# Patient Record
Sex: MALE | Race: WHITE | Employment: FULL TIME | URBAN - METROPOLITAN AREA
[De-identification: names, ages, dates, MRNs, and addresses within clinical notes are randomized per-mention and may not be internally consistent; named-entity substitution may affect disease eponyms.]

---

## 2017-08-25 ENCOUNTER — OFFICE VISIT (OUTPATIENT)
Dept: FAMILY MEDICINE CLINIC | Facility: CLINIC | Age: 52
End: 2017-08-25

## 2017-08-25 VITALS
HEART RATE: 84 BPM | BODY MASS INDEX: 38 KG/M2 | DIASTOLIC BLOOD PRESSURE: 82 MMHG | WEIGHT: 257 LBS | SYSTOLIC BLOOD PRESSURE: 140 MMHG

## 2017-08-25 DIAGNOSIS — Z00.00 ROUTINE GENERAL MEDICAL EXAMINATION AT A HEALTH CARE FACILITY: ICD-10-CM

## 2017-08-25 DIAGNOSIS — I10 ESSENTIAL HYPERTENSION: Primary | ICD-10-CM

## 2017-08-25 PROCEDURE — 99212 OFFICE O/P EST SF 10 MIN: CPT | Performed by: FAMILY MEDICINE

## 2017-08-25 PROCEDURE — 99203 OFFICE O/P NEW LOW 30 MIN: CPT | Performed by: FAMILY MEDICINE

## 2017-08-25 RX ORDER — LISINOPRIL 20 MG/1
20 TABLET ORAL DAILY
Qty: 30 TABLET | Refills: 0 | Status: SHIPPED | OUTPATIENT
Start: 2017-08-25 | End: 2017-09-15

## 2017-08-31 ENCOUNTER — LAB ENCOUNTER (OUTPATIENT)
Dept: LAB | Age: 52
End: 2017-08-31
Attending: FAMILY MEDICINE
Payer: COMMERCIAL

## 2017-08-31 DIAGNOSIS — Z00.00 ROUTINE GENERAL MEDICAL EXAMINATION AT A HEALTH CARE FACILITY: ICD-10-CM

## 2017-08-31 LAB
ALBUMIN SERPL BCP-MCNC: 4.5 G/DL (ref 3.5–4.8)
ALBUMIN/GLOB SERPL: 1.7 {RATIO} (ref 1–2)
ALP SERPL-CCNC: 52 U/L (ref 32–100)
ALT SERPL-CCNC: 30 U/L (ref 17–63)
ANION GAP SERPL CALC-SCNC: 7 MMOL/L (ref 0–18)
AST SERPL-CCNC: 22 U/L (ref 15–41)
BASOPHILS # BLD: 0.1 K/UL (ref 0–0.2)
BASOPHILS NFR BLD: 1 %
BILIRUB SERPL-MCNC: 0.5 MG/DL (ref 0.3–1.2)
BILIRUB UR QL: NEGATIVE
BUN SERPL-MCNC: 14 MG/DL (ref 8–20)
BUN/CREAT SERPL: 14.1 (ref 10–20)
CALCIUM SERPL-MCNC: 9.4 MG/DL (ref 8.5–10.5)
CHLORIDE SERPL-SCNC: 105 MMOL/L (ref 95–110)
CHOLEST SERPL-MCNC: 193 MG/DL (ref 110–200)
CO2 SERPL-SCNC: 29 MMOL/L (ref 22–32)
COLOR UR: YELLOW
CREAT SERPL-MCNC: 0.99 MG/DL (ref 0.5–1.5)
EOSINOPHIL # BLD: 0.1 K/UL (ref 0–0.7)
EOSINOPHIL NFR BLD: 1 %
ERYTHROCYTE [DISTWIDTH] IN BLOOD BY AUTOMATED COUNT: 13.9 % (ref 11–15)
GLOBULIN PLAS-MCNC: 2.6 G/DL (ref 2.5–3.7)
GLUCOSE SERPL-MCNC: 107 MG/DL (ref 70–99)
GLUCOSE UR-MCNC: NEGATIVE MG/DL
HCT VFR BLD AUTO: 40.9 % (ref 41–52)
HDLC SERPL-MCNC: 52 MG/DL
HGB BLD-MCNC: 13.8 G/DL (ref 13.5–17.5)
HGB UR QL STRIP.AUTO: NEGATIVE
KETONES UR-MCNC: NEGATIVE MG/DL
LDLC SERPL CALC-MCNC: 114 MG/DL (ref 0–99)
LEUKOCYTE ESTERASE UR QL STRIP.AUTO: NEGATIVE
LYMPHOCYTES # BLD: 2.5 K/UL (ref 1–4)
LYMPHOCYTES NFR BLD: 32 %
MCH RBC QN AUTO: 31.3 PG (ref 27–32)
MCHC RBC AUTO-ENTMCNC: 33.7 G/DL (ref 32–37)
MCV RBC AUTO: 92.8 FL (ref 80–100)
MONOCYTES # BLD: 0.6 K/UL (ref 0–1)
MONOCYTES NFR BLD: 8 %
NEUTROPHILS # BLD AUTO: 4.5 K/UL (ref 1.8–7.7)
NEUTROPHILS NFR BLD: 58 %
NITRITE UR QL STRIP.AUTO: NEGATIVE
NONHDLC SERPL-MCNC: 141 MG/DL
OSMOLALITY UR CALC.SUM OF ELEC: 293 MOSM/KG (ref 275–295)
PH UR: 5 [PH] (ref 5–8)
PLATELET # BLD AUTO: 206 K/UL (ref 140–400)
PMV BLD AUTO: 8.5 FL (ref 7.4–10.3)
POTASSIUM SERPL-SCNC: 4.1 MMOL/L (ref 3.3–5.1)
PROT SERPL-MCNC: 7.1 G/DL (ref 5.9–8.4)
PROT UR-MCNC: NEGATIVE MG/DL
PSA SERPL-MCNC: 2 NG/ML (ref 0–4)
RBC # BLD AUTO: 4.41 M/UL (ref 4.5–5.9)
SODIUM SERPL-SCNC: 141 MMOL/L (ref 136–144)
SP GR UR STRIP: 1.02 (ref 1–1.03)
TRIGL SERPL-MCNC: 135 MG/DL (ref 1–149)
UROBILINOGEN UR STRIP-ACNC: <2
VIT C UR-MCNC: NEGATIVE MG/DL
WBC # BLD AUTO: 7.8 K/UL (ref 4–11)

## 2017-08-31 PROCEDURE — 80061 LIPID PANEL: CPT

## 2017-08-31 PROCEDURE — 80053 COMPREHEN METABOLIC PANEL: CPT

## 2017-08-31 PROCEDURE — 85025 COMPLETE CBC W/AUTO DIFF WBC: CPT

## 2017-08-31 PROCEDURE — 36415 COLL VENOUS BLD VENIPUNCTURE: CPT

## 2017-08-31 PROCEDURE — 81003 URINALYSIS AUTO W/O SCOPE: CPT

## 2017-09-09 ENCOUNTER — TELEPHONE (OUTPATIENT)
Dept: OTHER | Age: 52
End: 2017-09-09

## 2017-09-09 NOTE — TELEPHONE ENCOUNTER
----- Message from Joseline Gonzalez DO sent at 9/8/2017  5:06 PM CDT -----  Please call patient and inform that the laboratory results are acceptable range.

## 2017-09-15 ENCOUNTER — OFFICE VISIT (OUTPATIENT)
Dept: FAMILY MEDICINE CLINIC | Facility: CLINIC | Age: 52
End: 2017-09-15

## 2017-09-15 VITALS
DIASTOLIC BLOOD PRESSURE: 80 MMHG | TEMPERATURE: 98 F | HEIGHT: 69 IN | HEART RATE: 79 BPM | SYSTOLIC BLOOD PRESSURE: 130 MMHG | BODY MASS INDEX: 39.4 KG/M2 | WEIGHT: 266 LBS

## 2017-09-15 DIAGNOSIS — Z00.00 ROUTINE GENERAL MEDICAL EXAMINATION AT A HEALTH CARE FACILITY: Primary | ICD-10-CM

## 2017-09-15 DIAGNOSIS — G47.33 OSA (OBSTRUCTIVE SLEEP APNEA): ICD-10-CM

## 2017-09-15 DIAGNOSIS — I10 ESSENTIAL HYPERTENSION: ICD-10-CM

## 2017-09-15 PROCEDURE — 99396 PREV VISIT EST AGE 40-64: CPT | Performed by: FAMILY MEDICINE

## 2017-09-15 RX ORDER — LISINOPRIL 20 MG/1
20 TABLET ORAL DAILY
Qty: 90 TABLET | Refills: 3 | Status: SHIPPED | OUTPATIENT
Start: 2017-09-15 | End: 2018-08-01

## 2017-10-06 ENCOUNTER — TELEPHONE (OUTPATIENT)
Dept: GASTROENTEROLOGY | Facility: CLINIC | Age: 52
End: 2017-10-06

## 2017-10-06 ENCOUNTER — OFFICE VISIT (OUTPATIENT)
Dept: GASTROENTEROLOGY | Facility: CLINIC | Age: 52
End: 2017-10-06

## 2017-10-06 VITALS
WEIGHT: 263 LBS | BODY MASS INDEX: 39.4 KG/M2 | HEIGHT: 68.5 IN | HEART RATE: 69 BPM | SYSTOLIC BLOOD PRESSURE: 134 MMHG | DIASTOLIC BLOOD PRESSURE: 84 MMHG

## 2017-10-06 DIAGNOSIS — Z12.11 ENCOUNTER FOR SCREENING COLONOSCOPY: Primary | ICD-10-CM

## 2017-10-06 DIAGNOSIS — Z12.11 COLON CANCER SCREENING: Primary | ICD-10-CM

## 2017-10-06 PROCEDURE — 99243 OFF/OP CNSLTJ NEW/EST LOW 30: CPT | Performed by: PHYSICIAN ASSISTANT

## 2017-10-06 PROCEDURE — 99212 OFFICE O/P EST SF 10 MIN: CPT | Performed by: PHYSICIAN ASSISTANT

## 2017-10-06 NOTE — H&P
[address] [facility] [address] Gastroenterology                                                                                                  Clinic History and Physical     Pa Management:  multiple surgeries to left knee and right elbow (9645)      Past Surgical History:  1978: APPENDECTOMY  No date: [address]: ELBOW SURGERY      Comment: Right elbow  1978: KNEE SURGERY      Comment: Left knee  No date: oropharynx clear, mucus membranes appear moist  CV: regular rate and rhythm  Lung: moves air well; no labored breathing  Abdomen: +obese, soft, non-tender exam in all quadrants without rigidity or guarding, no abnormal bowel sounds noted, no masses are pal prescribed  -Congratulated patient on 2 years cigarette-free    Colonoscopy consent: I have discussed the risks, benefits, and alternatives to colonoscopy with the patient [who demonstrated understanding], including but not limited to the risks of bleeding

## 2017-10-06 NOTE — PATIENT INSTRUCTIONS
1. Schedule colonoscopy with Dr. Rob Mason with MAC sedation. 2.  bowel prep from pharmacy - I have prescribed Suprep. This is a smaller volume preparation.  If this is too expensive, however, please call my office and we will order an alternative

## 2017-10-06 NOTE — TELEPHONE ENCOUNTER
Scheduled for:  Colonoscopy 24815  Provider Name: Dr Janel Alvarado  Date:  Fri 12/08/17  Location:  [facility]  Sedation:  MAC  Time:  8:00 am  Prep: split dose suprep  Meds/Allergies Reconciled?:  Oxycodone  Diagnosis with codes:  Colon cancer screening Z12.11  Was patient

## 2017-10-19 NOTE — TELEPHONE ENCOUNTER
LMTCB- D/t change in Dr/ MG schedule, pt needs to be rescheduled for his procedure (new date of 12/29/17 on hold).

## 2017-10-20 NOTE — TELEPHONE ENCOUNTER
Pt contacted and he states that unfortunately at this time, he would prefer to wait until after the new year to arrange his CLN. He states he is planning on having shoulder surgery and will be off work for 4 weeks.  He states he wants to Reply.io all that tim

## 2017-12-01 ENCOUNTER — OFFICE VISIT (OUTPATIENT)
Dept: FAMILY MEDICINE CLINIC | Facility: CLINIC | Age: 52
End: 2017-12-01

## 2017-12-01 VITALS
DIASTOLIC BLOOD PRESSURE: 77 MMHG | HEART RATE: 76 BPM | SYSTOLIC BLOOD PRESSURE: 114 MMHG | WEIGHT: 260 LBS | BODY MASS INDEX: 39 KG/M2

## 2017-12-01 DIAGNOSIS — I10 ESSENTIAL HYPERTENSION: Primary | ICD-10-CM

## 2017-12-01 DIAGNOSIS — N52.2 DRUG-INDUCED ERECTILE DYSFUNCTION: ICD-10-CM

## 2017-12-01 PROCEDURE — 99214 OFFICE O/P EST MOD 30 MIN: CPT | Performed by: FAMILY MEDICINE

## 2017-12-01 PROCEDURE — 99212 OFFICE O/P EST SF 10 MIN: CPT | Performed by: FAMILY MEDICINE

## 2017-12-01 RX ORDER — TADALAFIL 20 MG/1
20 TABLET ORAL AS NEEDED
Qty: 3 TABLET | Refills: 5 | Status: SHIPPED | OUTPATIENT
Start: 2017-12-01 | End: 2018-01-08

## 2017-12-01 NOTE — PROGRESS NOTES
HPI:    Patient ID: Henrry Lagunas is a 46year old male.     HPI  Patient presents with:  Hypertension: f/u medication, will need form from [facility] signed  Shoulder Pain: c/o right shoulder, would like to discuss surgery Dr. Gaston Colin recommended    Review

## 2018-01-08 RX ORDER — IBUPROFEN 800 MG/1
800 TABLET ORAL EVERY 6 HOURS PRN
COMMUNITY
End: 2018-08-03

## 2018-01-11 NOTE — H&P
ORTHO SURGERY H&P  Jose Johnson is a 46year old male. MRN is [id number].  Admitted: (Not on file)    CC: Right shoulder pain    HPI: Mr. Maria Teresa Scott is a 46year old, right hand dominant , who presents complaining of right shoulder pain. He injured negative. Denies chest pain, shortness of breath, palpitations, fever, chills, sweats, weight change, fatigue, malaise, abdominal pain, nausea, vomiting, and diarrhea.      Vital Signs:  Ht 5' 8\" (1.727 m)   Wt 260 lb (117.9 kg)   BMI 39.53 kg/m²     Physi soft tissue calcifications or fractures. Assessment/Plan:    Mr. Bijal Mattson was seen and evaluated by Dr. Deangelo Hernandez. He has what appears to be an acute on chronic rotator cuff tear of the right shoulder. He has some atrophy on his MRI.     Given his young age

## 2018-01-15 ENCOUNTER — HOSPITAL ENCOUNTER (OUTPATIENT)
Facility: HOSPITAL | Age: 53
Setting detail: HOSPITAL OUTPATIENT SURGERY
Discharge: HOME | End: 2018-01-15
Attending: ORTHOPAEDIC SURGERY | Admitting: ORTHOPAEDIC SURGERY
Payer: COMMERCIAL

## 2018-01-15 ENCOUNTER — HOSPITAL ENCOUNTER (OUTPATIENT)
Facility: HOSPITAL | Age: 53
Setting detail: OBSERVATION
Discharge: HOME | End: 2018-01-16
Attending: EMERGENCY MEDICINE | Admitting: HOSPITALIST
Payer: COMMERCIAL

## 2018-01-15 ENCOUNTER — APPOINTMENT (OUTPATIENT)
Dept: GENERAL RADIOLOGY | Facility: HOSPITAL | Age: 53
End: 2018-01-15
Attending: EMERGENCY MEDICINE
Payer: COMMERCIAL

## 2018-01-15 ENCOUNTER — SURGERY (OUTPATIENT)
Age: 53
End: 2018-01-15

## 2018-01-15 ENCOUNTER — ANESTHESIA EVENT (OUTPATIENT)
Dept: SURGERY | Facility: HOSPITAL | Age: 53
End: 2018-01-15
Payer: COMMERCIAL

## 2018-01-15 ENCOUNTER — ANESTHESIA (OUTPATIENT)
Dept: SURGERY | Facility: HOSPITAL | Age: 53
End: 2018-01-15
Payer: COMMERCIAL

## 2018-01-15 VITALS
BODY MASS INDEX: 40.16 KG/M2 | DIASTOLIC BLOOD PRESSURE: 62 MMHG | WEIGHT: 265 LBS | TEMPERATURE: 98 F | OXYGEN SATURATION: 95 % | SYSTOLIC BLOOD PRESSURE: 128 MMHG | HEIGHT: 68 IN | HEART RATE: 79 BPM | RESPIRATION RATE: 18 BRPM

## 2018-01-15 DIAGNOSIS — M75.101 UNSPECIFIED ROTATOR CUFF TEAR OR RUPTURE OF RIGHT SHOULDER, NOT SPECIFIED AS TRAUMATIC: Primary | ICD-10-CM

## 2018-01-15 DIAGNOSIS — G56.80 PHRENIC NERVE PALSY: Primary | ICD-10-CM

## 2018-01-15 LAB
ANION GAP SERPL CALC-SCNC: 9 MMOL/L (ref 0–18)
APTT PPP: 24.4 SECONDS (ref 23.2–35.3)
BASOPHILS # BLD: 0 K/UL (ref 0–0.2)
BASOPHILS NFR BLD: 0 %
BUN SERPL-MCNC: 11 MG/DL (ref 8–20)
BUN/CREAT SERPL: 9.1 (ref 10–20)
CALCIUM SERPL-MCNC: 9.2 MG/DL (ref 8.5–10.5)
CHLORIDE SERPL-SCNC: 104 MMOL/L (ref 95–110)
CO2 SERPL-SCNC: 24 MMOL/L (ref 22–32)
CREAT SERPL-MCNC: 1.21 MG/DL (ref 0.5–1.5)
EOSINOPHIL # BLD: 0 K/UL (ref 0–0.7)
EOSINOPHIL NFR BLD: 0 %
ERYTHROCYTE [DISTWIDTH] IN BLOOD BY AUTOMATED COUNT: 14.6 % (ref 11–15)
GLUCOSE SERPL-MCNC: 153 MG/DL (ref 70–99)
HCT VFR BLD AUTO: 40.3 % (ref 41–52)
HGB BLD-MCNC: 13.4 G/DL (ref 13.5–17.5)
INR BLD: 1 (ref 0.9–1.2)
LYMPHOCYTES # BLD: 0.7 K/UL (ref 1–4)
LYMPHOCYTES NFR BLD: 7 %
MCH RBC QN AUTO: 31.2 PG (ref 27–32)
MCHC RBC AUTO-ENTMCNC: 33.3 G/DL (ref 32–37)
MCV RBC AUTO: 93.7 FL (ref 80–100)
MONOCYTES # BLD: 0.2 K/UL (ref 0–1)
MONOCYTES NFR BLD: 2 %
NEUTROPHILS # BLD AUTO: 8.4 K/UL (ref 1.8–7.7)
NEUTROPHILS NFR BLD: 90 %
OSMOLALITY UR CALC.SUM OF ELEC: 286 MOSM/KG (ref 275–295)
PLATELET # BLD AUTO: 274 K/UL (ref 140–400)
PMV BLD AUTO: 7.2 FL (ref 7.4–10.3)
POTASSIUM SERPL-SCNC: 4.6 MMOL/L (ref 3.3–5.1)
PROTHROMBIN TIME: 12.6 SECONDS (ref 11.8–14.5)
RBC # BLD AUTO: 4.3 M/UL (ref 4.5–5.9)
SODIUM SERPL-SCNC: 137 MMOL/L (ref 136–144)
TROPONIN I SERPL-MCNC: 0 NG/ML (ref ?–0.03)
WBC # BLD AUTO: 9.3 K/UL (ref 4–11)

## 2018-01-15 PROCEDURE — 64415 NJX AA&/STRD BRCH PLXS IMG: CPT | Performed by: ORTHOPAEDIC SURGERY

## 2018-01-15 PROCEDURE — 71045 X-RAY EXAM CHEST 1 VIEW: CPT | Performed by: EMERGENCY MEDICINE

## 2018-01-15 PROCEDURE — 99220 INITIAL OBSERVATION CARE,LEVL III: CPT | Performed by: HOSPITALIST

## 2018-01-15 PROCEDURE — 99152 MOD SED SAME PHYS/QHP 5/>YRS: CPT | Performed by: ORTHOPAEDIC SURGERY

## 2018-01-15 PROCEDURE — 3E0T3BZ INTRODUCTION OF ANESTHETIC AGENT INTO PERIPHERAL NERVES AND PLEXI, PERCUTANEOUS APPROACH: ICD-10-PCS | Performed by: ANESTHESIOLOGY

## 2018-01-15 PROCEDURE — 0LQ14ZZ REPAIR RIGHT SHOULDER TENDON, PERCUTANEOUS ENDOSCOPIC APPROACH: ICD-10-PCS | Performed by: ORTHOPAEDIC SURGERY

## 2018-01-15 PROCEDURE — 76942 ECHO GUIDE FOR BIOPSY: CPT | Performed by: ORTHOPAEDIC SURGERY

## 2018-01-15 PROCEDURE — 0RNJ4ZZ RELEASE RIGHT SHOULDER JOINT, PERCUTANEOUS ENDOSCOPIC APPROACH: ICD-10-PCS | Performed by: ORTHOPAEDIC SURGERY

## 2018-01-15 DEVICE — ANCHOR SUT 5.5MM BCMPS: Type: IMPLANTABLE DEVICE | Site: SHOULDER | Status: FUNCTIONAL

## 2018-01-15 DEVICE — SYSTEM ESCP FX 19.1MM 4.75MM: Type: IMPLANTABLE DEVICE | Site: SHOULDER | Status: FUNCTIONAL

## 2018-01-15 RX ORDER — SODIUM CHLORIDE, SODIUM LACTATE, POTASSIUM CHLORIDE, CALCIUM CHLORIDE 600; 310; 30; 20 MG/100ML; MG/100ML; MG/100ML; MG/100ML
INJECTION, SOLUTION INTRAVENOUS CONTINUOUS
Status: DISCONTINUED | OUTPATIENT
Start: 2018-01-15 | End: 2018-01-15

## 2018-01-15 RX ORDER — ROPIVACAINE HYDROCHLORIDE 5 MG/ML
INJECTION, SOLUTION EPIDURAL; INFILTRATION; PERINEURAL AS NEEDED
Status: DISCONTINUED | OUTPATIENT
Start: 2018-01-15 | End: 2018-01-15 | Stop reason: SURG

## 2018-01-15 RX ORDER — ACETAMINOPHEN 325 MG/1
650 TABLET ORAL EVERY 4 HOURS PRN
Status: DISCONTINUED | OUTPATIENT
Start: 2018-01-15 | End: 2018-01-16

## 2018-01-15 RX ORDER — HYDROCODONE BITARTRATE AND ACETAMINOPHEN 7.5; 325 MG/1; MG/1
1 TABLET ORAL EVERY 4 HOURS PRN
Status: DISCONTINUED | OUTPATIENT
Start: 2018-01-15 | End: 2018-01-15

## 2018-01-15 RX ORDER — MORPHINE SULFATE 15 MG/1
15 TABLET ORAL EVERY 4 HOURS PRN
Status: CANCELLED | OUTPATIENT
Start: 2018-01-15 | End: 2018-01-16

## 2018-01-15 RX ORDER — MIDAZOLAM HYDROCHLORIDE 1 MG/ML
INJECTION INTRAMUSCULAR; INTRAVENOUS AS NEEDED
Status: DISCONTINUED | OUTPATIENT
Start: 2018-01-15 | End: 2018-01-15 | Stop reason: SURG

## 2018-01-15 RX ORDER — ACETAMINOPHEN 325 MG/1
650 TABLET ORAL EVERY 6 HOURS PRN
Status: DISCONTINUED | OUTPATIENT
Start: 2018-01-15 | End: 2018-01-16

## 2018-01-15 RX ORDER — DEXAMETHASONE SODIUM PHOSPHATE 10 MG/ML
INJECTION, SOLUTION INTRAMUSCULAR; INTRAVENOUS AS NEEDED
Status: DISCONTINUED | OUTPATIENT
Start: 2018-01-15 | End: 2018-01-15 | Stop reason: SURG

## 2018-01-15 RX ORDER — LIDOCAINE HYDROCHLORIDE 10 MG/ML
INJECTION, SOLUTION EPIDURAL; INFILTRATION; INTRACAUDAL; PERINEURAL AS NEEDED
Status: DISCONTINUED | OUTPATIENT
Start: 2018-01-15 | End: 2018-01-15 | Stop reason: SURG

## 2018-01-15 RX ORDER — HYDROCODONE BITARTRATE AND ACETAMINOPHEN 7.5; 325 MG/1; MG/1
1 TABLET ORAL EVERY 4 HOURS PRN
Qty: 60 TABLET | Refills: 0 | Status: SHIPPED | OUTPATIENT
Start: 2018-01-15 | End: 2018-08-03

## 2018-01-15 RX ORDER — DEXAMETHASONE SODIUM PHOSPHATE 4 MG/ML
VIAL (ML) INJECTION AS NEEDED
Status: DISCONTINUED | OUTPATIENT
Start: 2018-01-15 | End: 2018-01-15 | Stop reason: SURG

## 2018-01-15 RX ORDER — ROCURONIUM BROMIDE 10 MG/ML
INJECTION, SOLUTION INTRAVENOUS AS NEEDED
Status: DISCONTINUED | OUTPATIENT
Start: 2018-01-15 | End: 2018-01-15 | Stop reason: SURG

## 2018-01-15 RX ORDER — ENOXAPARIN SODIUM 100 MG/ML
30 INJECTION SUBCUTANEOUS EVERY 12 HOURS
Status: DISCONTINUED | OUTPATIENT
Start: 2018-01-15 | End: 2018-01-16

## 2018-01-15 RX ORDER — MORPHINE SULFATE 4 MG/ML
6 INJECTION, SOLUTION INTRAMUSCULAR; INTRAVENOUS EVERY 2 HOUR PRN
Status: CANCELLED | OUTPATIENT
Start: 2018-01-15 | End: 2018-01-16

## 2018-01-15 RX ORDER — MORPHINE SULFATE 2 MG/ML
1 INJECTION, SOLUTION INTRAMUSCULAR; INTRAVENOUS EVERY 2 HOUR PRN
Status: DISCONTINUED | OUTPATIENT
Start: 2018-01-15 | End: 2018-01-16

## 2018-01-15 RX ORDER — ACETAMINOPHEN 500 MG
1000 TABLET ORAL ONCE
Status: COMPLETED | OUTPATIENT
Start: 2018-01-15 | End: 2018-01-15

## 2018-01-15 RX ORDER — MORPHINE SULFATE 10 MG/ML
6 INJECTION, SOLUTION INTRAMUSCULAR; INTRAVENOUS EVERY 10 MIN PRN
Status: DISCONTINUED | OUTPATIENT
Start: 2018-01-15 | End: 2018-01-15

## 2018-01-15 RX ORDER — GLYCOPYRROLATE 0.2 MG/ML
INJECTION INTRAMUSCULAR; INTRAVENOUS AS NEEDED
Status: DISCONTINUED | OUTPATIENT
Start: 2018-01-15 | End: 2018-01-15 | Stop reason: SURG

## 2018-01-15 RX ORDER — MORPHINE SULFATE 2 MG/ML
2 INJECTION, SOLUTION INTRAMUSCULAR; INTRAVENOUS EVERY 2 HOUR PRN
Status: DISCONTINUED | OUTPATIENT
Start: 2018-01-15 | End: 2018-01-16

## 2018-01-15 RX ORDER — FAMOTIDINE 20 MG/1
20 TABLET ORAL ONCE
Status: COMPLETED | OUTPATIENT
Start: 2018-01-15 | End: 2018-01-15

## 2018-01-15 RX ORDER — HYDROCODONE BITARTRATE AND ACETAMINOPHEN 5; 325 MG/1; MG/1
2 TABLET ORAL EVERY 4 HOURS PRN
Status: DISCONTINUED | OUTPATIENT
Start: 2018-01-15 | End: 2018-01-16

## 2018-01-15 RX ORDER — SODIUM CHLORIDE 0.9 % (FLUSH) 0.9 %
3 SYRINGE (ML) INJECTION AS NEEDED
Status: DISCONTINUED | OUTPATIENT
Start: 2018-01-15 | End: 2018-01-16

## 2018-01-15 RX ORDER — NALOXONE HYDROCHLORIDE 0.4 MG/ML
80 INJECTION, SOLUTION INTRAMUSCULAR; INTRAVENOUS; SUBCUTANEOUS AS NEEDED
Status: DISCONTINUED | OUTPATIENT
Start: 2018-01-15 | End: 2018-01-15

## 2018-01-15 RX ORDER — MORPHINE SULFATE 4 MG/ML
4 INJECTION, SOLUTION INTRAMUSCULAR; INTRAVENOUS EVERY 2 HOUR PRN
Status: DISCONTINUED | OUTPATIENT
Start: 2018-01-15 | End: 2018-01-16

## 2018-01-15 RX ORDER — HYDROMORPHONE HYDROCHLORIDE 1 MG/ML
0.2 INJECTION, SOLUTION INTRAMUSCULAR; INTRAVENOUS; SUBCUTANEOUS EVERY 5 MIN PRN
Status: DISCONTINUED | OUTPATIENT
Start: 2018-01-15 | End: 2018-01-15

## 2018-01-15 RX ORDER — HYDROCODONE BITARTRATE AND ACETAMINOPHEN 5; 325 MG/1; MG/1
1 TABLET ORAL AS NEEDED
Status: DISCONTINUED | OUTPATIENT
Start: 2018-01-15 | End: 2018-01-15

## 2018-01-15 RX ORDER — ACETAMINOPHEN 500 MG
1000 TABLET ORAL EVERY 8 HOURS
Status: CANCELLED | OUTPATIENT
Start: 2018-01-15 | End: 2018-01-16

## 2018-01-15 RX ORDER — MORPHINE SULFATE 4 MG/ML
4 INJECTION, SOLUTION INTRAMUSCULAR; INTRAVENOUS EVERY 10 MIN PRN
Status: DISCONTINUED | OUTPATIENT
Start: 2018-01-15 | End: 2018-01-15

## 2018-01-15 RX ORDER — HYDROCODONE BITARTRATE AND ACETAMINOPHEN 5; 325 MG/1; MG/1
2 TABLET ORAL AS NEEDED
Status: DISCONTINUED | OUTPATIENT
Start: 2018-01-15 | End: 2018-01-15

## 2018-01-15 RX ORDER — GABAPENTIN 100 MG/1
100 CAPSULE ORAL 2 TIMES DAILY
Qty: 6 CAPSULE | Refills: 0 | Status: SHIPPED | OUTPATIENT
Start: 2018-01-15 | End: 2018-01-26 | Stop reason: ALTCHOICE

## 2018-01-15 RX ORDER — HYDROMORPHONE HYDROCHLORIDE 1 MG/ML
0.4 INJECTION, SOLUTION INTRAMUSCULAR; INTRAVENOUS; SUBCUTANEOUS EVERY 5 MIN PRN
Status: DISCONTINUED | OUTPATIENT
Start: 2018-01-15 | End: 2018-01-15

## 2018-01-15 RX ORDER — OXYCODONE HYDROCHLORIDE 5 MG/1
5 TABLET ORAL EVERY 4 HOURS PRN
Status: CANCELLED | OUTPATIENT
Start: 2018-01-15 | End: 2018-01-16

## 2018-01-15 RX ORDER — METOCLOPRAMIDE 10 MG/1
10 TABLET ORAL ONCE
Status: COMPLETED | OUTPATIENT
Start: 2018-01-15 | End: 2018-01-15

## 2018-01-15 RX ORDER — MORPHINE SULFATE 2 MG/ML
2 INJECTION, SOLUTION INTRAMUSCULAR; INTRAVENOUS EVERY 10 MIN PRN
Status: DISCONTINUED | OUTPATIENT
Start: 2018-01-15 | End: 2018-01-15

## 2018-01-15 RX ORDER — MORPHINE SULFATE 4 MG/ML
4 INJECTION, SOLUTION INTRAMUSCULAR; INTRAVENOUS EVERY 2 HOUR PRN
Status: CANCELLED | OUTPATIENT
Start: 2018-01-15 | End: 2018-01-16

## 2018-01-15 RX ORDER — GABAPENTIN 100 MG/1
100 CAPSULE ORAL ONCE
Status: COMPLETED | OUTPATIENT
Start: 2018-01-15 | End: 2018-01-15

## 2018-01-15 RX ORDER — ONDANSETRON 2 MG/ML
INJECTION INTRAMUSCULAR; INTRAVENOUS AS NEEDED
Status: DISCONTINUED | OUTPATIENT
Start: 2018-01-15 | End: 2018-01-15 | Stop reason: SURG

## 2018-01-15 RX ORDER — HYDROCODONE BITARTRATE AND ACETAMINOPHEN 5; 325 MG/1; MG/1
1 TABLET ORAL EVERY 4 HOURS PRN
Status: DISCONTINUED | OUTPATIENT
Start: 2018-01-15 | End: 2018-01-16

## 2018-01-15 RX ORDER — HALOPERIDOL 5 MG/ML
0.25 INJECTION INTRAMUSCULAR ONCE AS NEEDED
Status: DISCONTINUED | OUTPATIENT
Start: 2018-01-15 | End: 2018-01-15

## 2018-01-15 RX ORDER — ONDANSETRON 2 MG/ML
4 INJECTION INTRAMUSCULAR; INTRAVENOUS ONCE AS NEEDED
Status: DISCONTINUED | OUTPATIENT
Start: 2018-01-15 | End: 2018-01-15

## 2018-01-15 RX ORDER — OXYCODONE HYDROCHLORIDE 5 MG/1
10 TABLET ORAL EVERY 4 HOURS PRN
Status: CANCELLED | OUTPATIENT
Start: 2018-01-15 | End: 2018-01-16

## 2018-01-15 RX ORDER — MORPHINE SULFATE 2 MG/ML
2 INJECTION, SOLUTION INTRAMUSCULAR; INTRAVENOUS EVERY 2 HOUR PRN
Status: CANCELLED | OUTPATIENT
Start: 2018-01-15 | End: 2018-01-16

## 2018-01-15 RX ORDER — GABAPENTIN 100 MG/1
100 CAPSULE ORAL 2 TIMES DAILY
Status: DISCONTINUED | OUTPATIENT
Start: 2018-01-15 | End: 2018-01-15

## 2018-01-15 RX ORDER — ONDANSETRON 2 MG/ML
4 INJECTION INTRAMUSCULAR; INTRAVENOUS EVERY 6 HOURS PRN
Status: DISCONTINUED | OUTPATIENT
Start: 2018-01-15 | End: 2018-01-16

## 2018-01-15 RX ORDER — CEFAZOLIN SODIUM/WATER 2 G/20 ML
2 SYRINGE (ML) INTRAVENOUS ONCE
Status: COMPLETED | OUTPATIENT
Start: 2018-01-15 | End: 2018-01-15

## 2018-01-15 RX ORDER — HYDROMORPHONE HYDROCHLORIDE 1 MG/ML
0.6 INJECTION, SOLUTION INTRAMUSCULAR; INTRAVENOUS; SUBCUTANEOUS EVERY 5 MIN PRN
Status: DISCONTINUED | OUTPATIENT
Start: 2018-01-15 | End: 2018-01-15

## 2018-01-15 RX ADMIN — ROPIVACAINE HYDROCHLORIDE 30 ML: 5 INJECTION, SOLUTION EPIDURAL; INFILTRATION; PERINEURAL at 10:53:00

## 2018-01-15 RX ADMIN — DEXAMETHASONE SODIUM PHOSPHATE 5 MG: 10 INJECTION, SOLUTION INTRAMUSCULAR; INTRAVENOUS at 10:52:00

## 2018-01-15 RX ADMIN — LIDOCAINE HYDROCHLORIDE 10 MG: 10 INJECTION, SOLUTION EPIDURAL; INFILTRATION; INTRACAUDAL; PERINEURAL at 10:38:00

## 2018-01-15 RX ADMIN — DEXAMETHASONE SODIUM PHOSPHATE 10 MG: 10 INJECTION, SOLUTION INTRAMUSCULAR; INTRAVENOUS at 07:50:00

## 2018-01-15 RX ADMIN — MIDAZOLAM HYDROCHLORIDE 2 MG: 1 INJECTION INTRAMUSCULAR; INTRAVENOUS at 07:21:00

## 2018-01-15 RX ADMIN — SODIUM CHLORIDE, SODIUM LACTATE, POTASSIUM CHLORIDE, CALCIUM CHLORIDE: 600; 310; 30; 20 INJECTION, SOLUTION INTRAVENOUS at 07:57:00

## 2018-01-15 RX ADMIN — DEXAMETHASONE SODIUM PHOSPHATE 4 MG: 4 MG/ML VIAL (ML) INJECTION at 08:01:00

## 2018-01-15 RX ADMIN — CEFAZOLIN SODIUM/WATER 2 G: 2 G/20 ML SYRINGE (ML) INTRAVENOUS at 08:10:00

## 2018-01-15 RX ADMIN — ROPIVACAINE HYDROCHLORIDE 30 ML: 5 INJECTION, SOLUTION EPIDURAL; INFILTRATION; PERINEURAL at 07:50:00

## 2018-01-15 RX ADMIN — LIDOCAINE HYDROCHLORIDE 5 ML: 10 INJECTION, SOLUTION EPIDURAL; INFILTRATION; INTRACAUDAL; PERINEURAL at 07:24:00

## 2018-01-15 RX ADMIN — GLYCOPYRROLATE 0.1 MG: 0.2 INJECTION INTRAMUSCULAR; INTRAVENOUS at 08:01:00

## 2018-01-15 RX ADMIN — SODIUM CHLORIDE, SODIUM LACTATE, POTASSIUM CHLORIDE, CALCIUM CHLORIDE: 600; 310; 30; 20 INJECTION, SOLUTION INTRAVENOUS at 09:55:00

## 2018-01-15 RX ADMIN — LIDOCAINE HYDROCHLORIDE 50 MG: 10 INJECTION, SOLUTION EPIDURAL; INFILTRATION; INTRACAUDAL; PERINEURAL at 08:01:00

## 2018-01-15 RX ADMIN — ROCURONIUM BROMIDE 5 MG: 10 INJECTION, SOLUTION INTRAVENOUS at 08:01:00

## 2018-01-15 RX ADMIN — ONDANSETRON 4 MG: 2 INJECTION INTRAMUSCULAR; INTRAVENOUS at 09:55:00

## 2018-01-15 NOTE — ANESTHESIA PREPROCEDURE EVALUATION
Anesthesia PreOp Note    HPI:     Tawny Waterman is a 46year old male who presents for preoperative consultation requested by: Sonia Gee MD    Date of Surgery: 1/15/2018    Procedure(s):  SHOULDER ARTHROSCOPY ROTATOR CUFF REPAIR  Indication: Right Sexual activity: Not on file     Other Topics Concern    Caffeine Concern Yes    Comment: Tea, 2 cups daily. Social History Narrative   None on file       Available pre-op labs reviewed. Vital Signs: Body mass index is 40.29 kg/m².    heig I have informed this Prerna Oliveira [relative] of the risks of regional anesthesia including, but not limited to: failure, toxicity, seizure, cardiac arrest, infection, bleeding, nerve damage. The patient desires the proposed regional anesthetic as planned.

## 2018-01-15 NOTE — OPERATIVE REPORT
Operative Note    Patient Name: Dionicio Brian    Preoperative Diagnosis: Right shoulder rotator cuff tear     Postoperative Diagnosis: Right shoulder rotator cuff tear, subacromial impingement    Primary Surgeon: Galo Rivera MD     Assistant: Avelina Quinones

## 2018-01-15 NOTE — OPERATIVE REPORT
[facility]    PATIENT'S NAME: KATHERYN Nixon   ATTENDING PHYSICIAN: Dominik Edgar MD   OPERATING PHYSICIAN: Dominik Edgar MD   PATIENT ACCOUNT#:   [id number]    LOCATION:  [facility] PACU 1 [facility] 10  MEDICAL RECORD #:   [id number]       LEIGHA degenerative fraying and fibrillation, but no significant instability of the biceps anchor was noted. The labrum itself appeared well anchored to the superior glenoid. Anterior and posterior ninfa also had some degenerative changes but no tear.   3.   Sub motorized shaver and radiofrequency wand were used to debride the superior labrum of all fibrillated material.  The undersurface of the rotator cuff was also debrided of all fibrillated material.  Adhesions between the superior labrum and the supraspinatus remaining full-thickness defects in the rotator cuff noted. The excess suture was removed. Attention was paid to hemostasis. The instruments were removed. The portals were closed with 4-0 nylon sutures.   Sterile dressing was applied followed by an Ultr

## 2018-01-15 NOTE — ANESTHESIA POSTPROCEDURE EVALUATION
Patient: Quentin Guido    Procedure Summary     Date:  01/15/18 Room / Location:  [address] [place] [address] OR 07 / [address] [place] [address] OR    Anesthesia Start:  9624 Anesthesia Stop:  4526    Procedure:  SHOULDER ARTHROSCOPY ROTATOR CUFF REPAIR (Right ) Diagnosis:  (Right shoulder ro

## 2018-01-15 NOTE — ANESTHESIA PROCEDURE NOTES
Peripheral Block    Anesthesiologist:  Aylin Winter  Performed by:   Anesthesiologist  Patient Location:  PACU  Start Time:  1/15/2018 7:09 AM  End Time:  1/15/2018 7:51 AM  Site Identification: ultrasound guided, real time ultrasound guided, static ultrasou

## 2018-01-16 ENCOUNTER — APPOINTMENT (OUTPATIENT)
Dept: GENERAL RADIOLOGY | Facility: HOSPITAL | Age: 53
End: 2018-01-16
Attending: HOSPITALIST
Payer: COMMERCIAL

## 2018-01-16 VITALS
DIASTOLIC BLOOD PRESSURE: 84 MMHG | TEMPERATURE: 99 F | SYSTOLIC BLOOD PRESSURE: 139 MMHG | HEART RATE: 87 BPM | RESPIRATION RATE: 18 BRPM | WEIGHT: 260 LBS | HEIGHT: 68 IN | BODY MASS INDEX: 39.4 KG/M2 | OXYGEN SATURATION: 97 %

## 2018-01-16 LAB
ANION GAP SERPL CALC-SCNC: 9 MMOL/L (ref 0–18)
BASOPHILS # BLD: 0 K/UL (ref 0–0.2)
BASOPHILS NFR BLD: 0 %
BUN SERPL-MCNC: 13 MG/DL (ref 8–20)
BUN/CREAT SERPL: 13.7 (ref 10–20)
CALCIUM SERPL-MCNC: 8.6 MG/DL (ref 8.5–10.5)
CHLORIDE SERPL-SCNC: 107 MMOL/L (ref 95–110)
CO2 SERPL-SCNC: 21 MMOL/L (ref 22–32)
CREAT SERPL-MCNC: 0.95 MG/DL (ref 0.5–1.5)
EOSINOPHIL # BLD: 0 K/UL (ref 0–0.7)
EOSINOPHIL NFR BLD: 0 %
ERYTHROCYTE [DISTWIDTH] IN BLOOD BY AUTOMATED COUNT: 14.7 % (ref 11–15)
GLUCOSE SERPL-MCNC: 136 MG/DL (ref 70–99)
HCT VFR BLD AUTO: 34.9 % (ref 41–52)
HGB BLD-MCNC: 11.4 G/DL (ref 13.5–17.5)
LYMPHOCYTES # BLD: 2 K/UL (ref 1–4)
LYMPHOCYTES NFR BLD: 16 %
MCH RBC QN AUTO: 31 PG (ref 27–32)
MCHC RBC AUTO-ENTMCNC: 32.8 G/DL (ref 32–37)
MCV RBC AUTO: 94.4 FL (ref 80–100)
MONOCYTES # BLD: 1 K/UL (ref 0–1)
MONOCYTES NFR BLD: 8 %
NEUTROPHILS # BLD AUTO: 9.8 K/UL (ref 1.8–7.7)
NEUTROPHILS NFR BLD: 76 %
OSMOLALITY UR CALC.SUM OF ELEC: 286 MOSM/KG (ref 275–295)
PLATELET # BLD AUTO: 244 K/UL (ref 140–400)
PMV BLD AUTO: 7.5 FL (ref 7.4–10.3)
POTASSIUM SERPL-SCNC: 4.2 MMOL/L (ref 3.3–5.1)
RBC # BLD AUTO: 3.69 M/UL (ref 4.5–5.9)
SODIUM SERPL-SCNC: 137 MMOL/L (ref 136–144)
WBC # BLD AUTO: 12.8 K/UL (ref 4–11)

## 2018-01-16 PROCEDURE — 71046 X-RAY EXAM CHEST 2 VIEWS: CPT | Performed by: HOSPITALIST

## 2018-01-16 PROCEDURE — 99217 OBSERVATION CARE DISCHARGE: CPT | Performed by: HOSPITALIST

## 2018-01-16 NOTE — PROGRESS NOTES
Subjective: No complaints. Pain controlled. Dyspnea resolved. No chest pain.     Objective:  Patient Vitals for the past 24 hrs:   BP Temp Temp src Pulse Resp SpO2 Height Weight   01/16/18 0519 141/88 98.6 °F (37 °C) Oral 87 18 97 % - -   01/16/18 0234 -

## 2018-01-16 NOTE — RESPIRATORY THERAPY NOTE
ANATOLY ASSESSMENT:    Pt does have a previous diagnosis of ANATOLY. Pt does routinely use a CPAP device at home. This pt is suspected to be at high risk for ANATOLY and sleep lab packet was provided to patient for outpatient follow-up.     CPAP INITIATION:    Pt to be

## 2018-01-16 NOTE — H&P
[facility]    PATIENT'S NAME: Jillian Colindres   ATTENDING PHYSICIAN: Kylee Fabian MD   PATIENT ACCOUNT#:   [id number]    LOCATION:  [address] [place] [address] RECORD #:   [id number]       YOB: 1965  ADMISSION DATE:       01/15/2018 SIGNS:  Temperature 97.6, pulse 105, respiratory rate 22, blood pressure 156/68, pulse oximetry 96% on room air. HEENT:  Atraumatic. Oropharynx clear. Moist mucous membranes. Normal hard and soft palate. NECK:  Trachea midline. Full range of motion.

## 2018-01-16 NOTE — PROGRESS NOTES
[facility] Note: Antimicrobial Weight Dose Adjustment for:Enoxaparin Sodium (LOVENOX)     Beata Cintron is a 46year old male who has been prescribed Enoxaparin Sodium (LOVENOX) injection 40 mg daily.  CrCl is estimated creatinine clearance is 69.1 mL/min

## 2018-01-16 NOTE — ED PROVIDER NOTES
Patient Seen in: [facility] 4w/sw/se    History   Patient presents with:  Breathing Problem    Stated Complaint: SOB s/p rotator cuff surgery this morning     HPI    46year old male who had R rotator cuff surgery this a.m now presents with shortn 97.6 °F (36.4 °C)  Temp src: Oral  SpO2: 96 %  O2 Device: None (Room air)    Current:/81 (BP Location: Right arm)   Pulse 80   Temp 98.8 °F (37.1 °C) (Oral)   Resp 18   Ht 172.7 cm (5' 8\")   Wt 117.9 kg   SpO2 95%   BMI 39.53 kg/m²         Physical 4.30 (*)     HGB 13.4 (*)     HCT 40.3 (*)     MPV 7.2 (*)     Neutrophil Absolute 8.4 (*)     Lymphocyte Absolute 0.7 (*)     All other components within normal limits   TROPONIN I - Normal   PROTHROMBIN TIME (PT) - Normal   PTT, ACTIVATED - Normal   CBC possible phrenic nerve palsy possible side effect of earlier nerve block. Other possibilities were considered including PE, pna, ptx.  There is no infiltrate or ptx noted on CXR and the surgery occurred this a.m and does not seem consistent with PE but pt w

## 2018-01-16 NOTE — DISCHARGE SUMMARY
More than 30 min spent on dc  Dc summary # [id number]  Hospital Discharge Diagnoses: s/p shoulder surg    Lace+ Score: 25  59-90 High Risk  29-58 Medium Risk  0-28   Low Risk.     TCM Follow-Up Recommendation:  Low risk no tcm fu recommeded

## 2018-01-17 ENCOUNTER — TELEPHONE (OUTPATIENT)
Dept: INTERNAL MEDICINE UNIT | Facility: HOSPITAL | Age: 53
End: 2018-01-17

## 2018-01-17 ENCOUNTER — PATIENT OUTREACH (OUTPATIENT)
Dept: CASE MANAGEMENT | Age: 53
End: 2018-01-17

## 2018-01-17 NOTE — PROGRESS NOTES
Initial Post Discharge Follow Up   Discharge Date: 1/16/18  Contact Date: 1/17/2018    Consent Verification:  Assessment Completed With: Patient  HIPAA Verified?   Yes     Discharge Dx:   Phrenic nerve paralysis     General:   • How have you been since yo prescribed a new medication: No new medication   • May I go over your medications with you to make sure we are not missing anything? yes  • Are there any reasons that keep you from taking your medication as prescribed?  No  Are you having any concerns with c tabs PO Q4hrs PRN by Dr Divya Tran office until pain is controlled then to decrease dose back to 1 tab PO Q 4 hrs PRN. Pt stated today pain was more manageable. Pt did report to have c/o constipation last BM yesterday.  Pt stated Dr Divya Tran office had also

## 2018-01-17 NOTE — DISCHARGE SUMMARY
[facility]    PATIENT'S NAME: KATHERYN Kelley   ATTENDING PHYSICIAN: Leah Luz MD   PATIENT ACCOUNT#:   [id number]    LOCATION:  [address] [place] [address] RECORD #:   [id number]       YOB: 1965  ADMISSION DATE:       01/15/ MEDICATIONS:    1. Norco 7.5/325, 1 tablet q.4 h. as needed for pain. Watch total daily limited Tylenol to 3 g.  2.   Motrin 800 mg q.6 h. as needed. Take with food. 3.   Gabapentin 100 mg twice a day. 4.   Lisinopril 20 mg daily.     DISCHARGE INSTRU

## 2018-01-26 ENCOUNTER — OFFICE VISIT (OUTPATIENT)
Dept: FAMILY MEDICINE CLINIC | Facility: CLINIC | Age: 53
End: 2018-01-26

## 2018-01-26 VITALS
BODY MASS INDEX: 40 KG/M2 | HEART RATE: 87 BPM | WEIGHT: 260 LBS | DIASTOLIC BLOOD PRESSURE: 82 MMHG | SYSTOLIC BLOOD PRESSURE: 124 MMHG

## 2018-01-26 DIAGNOSIS — I10 ESSENTIAL HYPERTENSION: ICD-10-CM

## 2018-01-26 DIAGNOSIS — G56.80 PHRENIC NERVE PARALYSIS: Primary | ICD-10-CM

## 2018-01-26 PROCEDURE — 99495 TRANSJ CARE MGMT MOD F2F 14D: CPT | Performed by: FAMILY MEDICINE
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
## 2018-05-23 ENCOUNTER — TELEPHONE (OUTPATIENT)
Dept: OTHER | Age: 53
End: 2018-05-23

## 2018-05-23 NOTE — TELEPHONE ENCOUNTER
Pt called stated RX Cialis is too costly, he checked with his INS RX Revatio (Sildenasil) 20 mg  #50 is affordable -requesting a change   Please reply to helen: EM RN TRIAGE    Please advise on sig/qty

## 2018-05-24 RX ORDER — SILDENAFIL CITRATE 20 MG/1
TABLET ORAL
Qty: 25 TABLET | Refills: 3 | Status: SHIPPED | OUTPATIENT
Start: 2018-05-24 | End: 2018-08-03

## 2018-05-24 NOTE — TELEPHONE ENCOUNTER
Received call from patient who wanted to check on the status of the message below. Message routed to provider for review.      Please reply to helen: YAA Montelongo

## 2018-08-01 RX ORDER — LISINOPRIL 20 MG/1
TABLET ORAL
Qty: 90 TABLET | Refills: 0 | Status: SHIPPED | OUTPATIENT
Start: 2018-08-01 | End: 2018-08-03

## 2018-08-02 ENCOUNTER — NURSE TRIAGE (OUTPATIENT)
Dept: OTHER | Age: 53
End: 2018-08-02

## 2018-08-02 DIAGNOSIS — K52.9 GASTROENTERITIS: Primary | ICD-10-CM

## 2018-08-02 RX ORDER — SILDENAFIL CITRATE 20 MG/1
TABLET ORAL
Qty: 50 TABLET | Refills: 3 | Status: CANCELLED | OUTPATIENT
Start: 2018-08-02

## 2018-08-02 NOTE — TELEPHONE ENCOUNTER
Hypertensive Medications  Protocol Criteria:  · Appointment scheduled in the past 6 months or in the next 3 months  · BMP or CMP in the past 12 months  · Creatinine result < 2  Recent Outpatient Visits            6 months ago Phrenic nerve paralysis    [facility]

## 2018-08-02 NOTE — TELEPHONE ENCOUNTER
Dr Geanie Cooks, please advise on this script. Patient called, asked for refill with disp of 50, stated that he gets discount [facility] if the quantity is 50, but not the same discount if 30. Pharmacy verified.     Refill Protocol Appointment Criteria  · Appointm

## 2018-08-02 NOTE — TELEPHONE ENCOUNTER
There has been significant stomach illness around. Some require abx . Need to run test - see order and have him  speciman container.

## 2018-08-02 NOTE — TELEPHONE ENCOUNTER
Action Requested: Summary for Provider     []  Critical Lab, Recommendations Needed  [x] Need Additional Advice  []   FYI    []   Need Orders  [] Need Medications Sent to Pharmacy  []  Other     SUMMARY:  Dr Adela Chandler, please advise.  Scheduled appt 8/3/18 at

## 2018-08-03 ENCOUNTER — APPOINTMENT (OUTPATIENT)
Dept: LAB | Age: 53
End: 2018-08-03
Attending: FAMILY MEDICINE
Payer: COMMERCIAL

## 2018-08-03 ENCOUNTER — OFFICE VISIT (OUTPATIENT)
Dept: FAMILY MEDICINE CLINIC | Facility: CLINIC | Age: 53
End: 2018-08-03
Payer: COMMERCIAL

## 2018-08-03 VITALS
SYSTOLIC BLOOD PRESSURE: 115 MMHG | DIASTOLIC BLOOD PRESSURE: 77 MMHG | HEART RATE: 85 BPM | BODY MASS INDEX: 40 KG/M2 | TEMPERATURE: 98 F | WEIGHT: 265 LBS

## 2018-08-03 DIAGNOSIS — K52.9 GASTROENTERITIS: ICD-10-CM

## 2018-08-03 DIAGNOSIS — K52.9 GASTROENTERITIS: Primary | ICD-10-CM

## 2018-08-03 DIAGNOSIS — G47.33 OSA (OBSTRUCTIVE SLEEP APNEA): ICD-10-CM

## 2018-08-03 LAB
ADENOVIRUS F 40/41 PCR: NEGATIVE
ASTROVIRUS PCR: NEGATIVE
C CAYETANENSIS DNA SPEC QL NAA+PROBE: NEGATIVE
C. DIFFICILE TOXIN A/B PCR: NEGATIVE
CAMPY SP DNA.DIARRHEA STL QL NAA+PROBE: NEGATIVE
CRYPTOSP DNA SPEC QL NAA+PROBE: NEGATIVE
EAEC PAA PLAS AGGR+AATA ST NAA+NON-PRB: NEGATIVE
EC STX1+STX2 + H7 FLIC SPEC NAA+PROBE: NEGATIVE
ENTAMOEBA HISTOLYTICA PCR: NEGATIVE
EPEC EAE GENE STL QL NAA+NON-PROBE: NEGATIVE
ETEC LTA+ST1A+ST1B TOX ST NAA+NON-PROBE: NEGATIVE
GIARDIA LAMBLIA PCR: NEGATIVE
NOROVIRUS GI/GII PCR: NEGATIVE
P SHIGELLOIDES DNA STL QL NAA+PROBE: NEGATIVE
ROTAVIRUS A PCR: NEGATIVE
SALMONELLA DNA SPEC QL NAA+PROBE: NEGATIVE
SAPOVIRUS PCR: POSITIVE
SHIGELLA SP+EIEC IPAH ST NAA+NON-PROBE: NEGATIVE
V CHOLERAE DNA SPEC QL NAA+PROBE: NEGATIVE
VIBRIO DNA SPEC NAA+PROBE: NEGATIVE
YERSINIA DNA SPEC NAA+PROBE: NEGATIVE

## 2018-08-03 PROCEDURE — 87507 IADNA-DNA/RNA PROBE TQ 12-25: CPT

## 2018-08-03 PROCEDURE — 99214 OFFICE O/P EST MOD 30 MIN: CPT | Performed by: FAMILY MEDICINE

## 2018-08-03 PROCEDURE — 99212 OFFICE O/P EST SF 10 MIN: CPT | Performed by: FAMILY MEDICINE

## 2018-08-03 RX ORDER — LISINOPRIL 20 MG/1
TABLET ORAL
Qty: 90 TABLET | Refills: 0 | Status: SHIPPED | OUTPATIENT
Start: 2018-08-03 | End: 2018-12-07

## 2018-08-03 RX ORDER — SILDENAFIL CITRATE 20 MG/1
TABLET ORAL
Qty: 50 TABLET | Refills: 4 | Status: SHIPPED | OUTPATIENT
Start: 2018-08-03 | End: 2019-12-13

## 2018-08-15 NOTE — TELEPHONE ENCOUNTER
Patient calling and FU refill request, states that he's been asking for it for 2 weeks now, advised that it was esent last 8/3/18 to [facility] that he is not using [facility] anymore, he is using [facility].   Called [facility] and spoke  With Laura dejesus

## 2018-08-30 ENCOUNTER — TELEPHONE (OUTPATIENT)
Dept: FAMILY MEDICINE CLINIC | Facility: CLINIC | Age: 53
End: 2018-08-30

## 2018-12-05 ENCOUNTER — TELEPHONE (OUTPATIENT)
Dept: FAMILY MEDICINE CLINIC | Facility: CLINIC | Age: 53
End: 2018-12-05

## 2018-12-05 NOTE — TELEPHONE ENCOUNTER
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 Please see all docs and letter in doctors bin.

## 2018-12-07 ENCOUNTER — OFFICE VISIT (OUTPATIENT)
Dept: FAMILY MEDICINE CLINIC | Facility: CLINIC | Age: 53
End: 2018-12-07
Payer: COMMERCIAL

## 2018-12-07 ENCOUNTER — MED REC SCAN ONLY (OUTPATIENT)
Dept: FAMILY MEDICINE CLINIC | Facility: CLINIC | Age: 53
End: 2018-12-07

## 2018-12-07 VITALS
WEIGHT: 272 LBS | HEIGHT: 70 IN | SYSTOLIC BLOOD PRESSURE: 178 MMHG | TEMPERATURE: 98 F | DIASTOLIC BLOOD PRESSURE: 78 MMHG | HEART RATE: 81 BPM | BODY MASS INDEX: 38.94 KG/M2

## 2018-12-07 DIAGNOSIS — I10 ESSENTIAL HYPERTENSION: ICD-10-CM

## 2018-12-07 DIAGNOSIS — Z00.00 ROUTINE GENERAL MEDICAL EXAMINATION AT A HEALTH CARE FACILITY: Primary | ICD-10-CM

## 2018-12-07 DIAGNOSIS — G47.33 OSA (OBSTRUCTIVE SLEEP APNEA): ICD-10-CM

## 2018-12-07 PROCEDURE — 90686 IIV4 VACC NO PRSV 0.5 ML IM: CPT | Performed by: FAMILY MEDICINE

## 2018-12-07 PROCEDURE — 90471 IMMUNIZATION ADMIN: CPT | Performed by: FAMILY MEDICINE

## 2018-12-07 PROCEDURE — 99396 PREV VISIT EST AGE 40-64: CPT | Performed by: FAMILY MEDICINE

## 2018-12-07 RX ORDER — LISINOPRIL 20 MG/1
TABLET ORAL
Qty: 90 TABLET | Refills: 3 | Status: SHIPPED | OUTPATIENT
Start: 2018-12-07 | End: 2019-12-10

## 2018-12-07 NOTE — PROGRESS NOTES
consults of cpap, sleep study reports, various reports, sent to dr terrell reviewed on OV pt had on 12/7/18

## 2018-12-07 NOTE — PROGRESS NOTES
HPI:    Patient ID: Mark Flores is a 48year old male.     HPI  Patient presents with:  Complete Form: FORMS TO BE COMPLETED, FOR SLEEP APNEA, CONCERNS WITH WT AGE FOR CPAP   Use fitted oral appliance and ENT surgery for successful treatment of sleep apne equal, round, and reactive to light. Fundoscopic exam:       The right eye shows no hemorrhage and no papilledema. The left eye shows no hemorrhage and no papilledema. Neck: Trachea normal and normal range of motion. Neck supple.  Normal carotid

## 2018-12-14 ENCOUNTER — LAB ENCOUNTER (OUTPATIENT)
Dept: LAB | Age: 53
End: 2018-12-14
Attending: FAMILY MEDICINE
Payer: COMMERCIAL

## 2018-12-14 DIAGNOSIS — Z00.00 ROUTINE GENERAL MEDICAL EXAMINATION AT A HEALTH CARE FACILITY: ICD-10-CM

## 2018-12-14 PROCEDURE — 80053 COMPREHEN METABOLIC PANEL: CPT

## 2018-12-14 PROCEDURE — 81003 URINALYSIS AUTO W/O SCOPE: CPT

## 2018-12-14 PROCEDURE — 85025 COMPLETE CBC W/AUTO DIFF WBC: CPT

## 2018-12-14 PROCEDURE — 36415 COLL VENOUS BLD VENIPUNCTURE: CPT

## 2018-12-14 PROCEDURE — 80061 LIPID PANEL: CPT

## 2019-01-08 ENCOUNTER — MED REC SCAN ONLY (OUTPATIENT)
Dept: FAMILY MEDICINE CLINIC | Facility: CLINIC | Age: 54
End: 2019-01-08

## 2019-01-09 NOTE — PROGRESS NOTES
[place] dental sleep study detailed written order for sleep study, requested referral with information for type of study that will be done in [facility], [facility] signature from  , will review

## 2019-01-23 NOTE — PROGRESS NOTES
Referral forms were signed and faxed to [phone], fax was successful, forms sent to scan with sleep study report

## 2019-07-06 ENCOUNTER — OFFICE VISIT (OUTPATIENT)
Dept: FAMILY MEDICINE CLINIC | Facility: CLINIC | Age: 54
End: 2019-07-06
Payer: COMMERCIAL

## 2019-07-06 VITALS
SYSTOLIC BLOOD PRESSURE: 143 MMHG | HEART RATE: 79 BPM | DIASTOLIC BLOOD PRESSURE: 94 MMHG | BODY MASS INDEX: 38 KG/M2 | WEIGHT: 265 LBS | TEMPERATURE: 98 F

## 2019-07-06 DIAGNOSIS — J01.00 ACUTE MAXILLARY SINUSITIS, RECURRENCE NOT SPECIFIED: Primary | ICD-10-CM

## 2019-07-06 PROCEDURE — 99213 OFFICE O/P EST LOW 20 MIN: CPT | Performed by: FAMILY MEDICINE

## 2019-07-06 RX ORDER — MELOXICAM 7.5 MG/1
TABLET ORAL
Refills: 2 | COMMUNITY
Start: 2019-01-17

## 2019-07-06 RX ORDER — AMOXICILLIN AND CLAVULANATE POTASSIUM 875; 125 MG/1; MG/1
1 TABLET, FILM COATED ORAL 2 TIMES DAILY
Qty: 14 TABLET | Refills: 0 | Status: SHIPPED | OUTPATIENT
Start: 2019-07-06 | End: 2019-07-13

## 2019-07-06 RX ORDER — FLUTICASONE PROPIONATE 50 MCG
2 SPRAY, SUSPENSION (ML) NASAL DAILY
Qty: 1 BOTTLE | Refills: 1 | Status: SHIPPED | OUTPATIENT
Start: 2019-07-06 | End: 2019-07-08 | Stop reason: RX

## 2019-07-06 NOTE — PROGRESS NOTES
HPI:    Patient ID: Faiza Graves is a 48year old male. HPI  Patient presents with:  Nasal Congestion  Cough: c.o cough for 3 weeks with mucus    Review of Systems   Constitutional: Negative. HENT: Positive for congestion and sore throat. • Fluticasone Propionate 50 MCG/ACT Nasal Suspension 1 Bottle 1     Si sprays by Each Nare route daily.        Imaging & Referrals:  None       SO#[id number]

## 2019-07-08 ENCOUNTER — TELEPHONE (OUTPATIENT)
Dept: FAMILY MEDICINE CLINIC | Facility: CLINIC | Age: 54
End: 2019-07-08

## 2019-07-08 RX ORDER — MOMETASONE 50 UG/1
1 SPRAY, METERED NASAL DAILY
Qty: 1 INHALER | Refills: 5 | Status: SHIPPED | OUTPATIENT
Start: 2019-07-08 | End: 2019-12-26

## 2019-07-08 NOTE — TELEPHONE ENCOUNTER
Receive fax regarding drug change request... Den Angles Den Angles Den Angles FLUTICASONE 50MCG NASAL SP (120) rx  QTY 16  SHAKE LIQUID AND USE 2 SPRAYS IN EACH NOSTRIL DAILY       Message: Drug is not covered by patient plan.  The preferred alternative is MOMETASONESPRMCG, FLUNISO

## 2019-12-10 RX ORDER — SILDENAFIL CITRATE 20 MG/1
TABLET ORAL
Qty: 50 TABLET | Refills: 0 | OUTPATIENT
Start: 2019-12-10

## 2019-12-10 RX ORDER — LISINOPRIL 20 MG/1
TABLET ORAL
Qty: 90 TABLET | Refills: 0 | Status: SHIPPED | OUTPATIENT
Start: 2019-12-10 | End: 2020-01-09

## 2019-12-10 NOTE — TELEPHONE ENCOUNTER
Review pended refill request as it does not fall under a protocol.     Last Rx: 08/03/18  LOV: 07/06/19

## 2019-12-10 NOTE — TELEPHONE ENCOUNTER
CSS, please call pt and help schedule follow-up visit with Dr. Jojo Montoya. Last routine general exam was 12/7/18.   Rx refilled [id number]  Hypertensive Medications  Protocol Criteria:  · Appointment scheduled in the past 6 months or in the next 3 months  · BMP o

## 2019-12-13 RX ORDER — SILDENAFIL CITRATE 20 MG/1
TABLET ORAL
Qty: 50 TABLET | Refills: 1 | Status: SHIPPED | OUTPATIENT
Start: 2019-12-13 | End: 2020-09-11

## 2019-12-13 NOTE — TELEPHONE ENCOUNTER
Patient needs a refill on   Sildenafil Citrate (REVATIO) 20 MG Oral Tab 50 tablet 4 8/3/2018    Sig:   Take 1-2 tablets as needed prior to sexual intercourse       He is leaving out of town on Sunday and he needs it before then. Thank you.

## 2019-12-13 NOTE — TELEPHONE ENCOUNTER
Refill Protocol Appointment Criteria Refill passed per [facility] protocol.     · Appointment scheduled in the past 12 months or in the next 3 months  Recent Outpatient Visits            5 months ago Acute maxillary sinusitis, recurrence not specified

## 2019-12-26 ENCOUNTER — OFFICE VISIT (OUTPATIENT)
Dept: FAMILY MEDICINE CLINIC | Facility: CLINIC | Age: 54
End: 2019-12-26
Payer: COMMERCIAL

## 2019-12-26 ENCOUNTER — LAB ENCOUNTER (OUTPATIENT)
Dept: LAB | Age: 54
End: 2019-12-26
Attending: FAMILY MEDICINE
Payer: COMMERCIAL

## 2019-12-26 VITALS
TEMPERATURE: 98 F | BODY MASS INDEX: 37.8 KG/M2 | SYSTOLIC BLOOD PRESSURE: 132 MMHG | HEIGHT: 70 IN | HEART RATE: 70 BPM | WEIGHT: 264 LBS | DIASTOLIC BLOOD PRESSURE: 76 MMHG

## 2019-12-26 DIAGNOSIS — Z00.00 ROUTINE GENERAL MEDICAL EXAMINATION AT A HEALTH CARE FACILITY: Primary | ICD-10-CM

## 2019-12-26 DIAGNOSIS — Z00.00 ROUTINE GENERAL MEDICAL EXAMINATION AT A HEALTH CARE FACILITY: ICD-10-CM

## 2019-12-26 DIAGNOSIS — J01.00 ACUTE MAXILLARY SINUSITIS, RECURRENCE NOT SPECIFIED: ICD-10-CM

## 2019-12-26 PROCEDURE — 80053 COMPREHEN METABOLIC PANEL: CPT

## 2019-12-26 PROCEDURE — 99213 OFFICE O/P EST LOW 20 MIN: CPT | Performed by: FAMILY MEDICINE

## 2019-12-26 PROCEDURE — 80061 LIPID PANEL: CPT

## 2019-12-26 PROCEDURE — 36415 COLL VENOUS BLD VENIPUNCTURE: CPT

## 2019-12-26 PROCEDURE — 85025 COMPLETE CBC W/AUTO DIFF WBC: CPT

## 2019-12-26 RX ORDER — AMOXICILLIN AND CLAVULANATE POTASSIUM 875; 125 MG/1; MG/1
1 TABLET, FILM COATED ORAL 2 TIMES DAILY
Qty: 14 TABLET | Refills: 0 | Status: SHIPPED | OUTPATIENT
Start: 2019-12-26 | End: 2020-01-02

## 2019-12-26 NOTE — PROGRESS NOTES
HPI:    Patient ID: Faiza Graves is a 47year old male.     HPI  Patient presents with:  Sinusitis: with headache, pressure,  sensativity to light,  congestion, cough,  started last thursday, body aches chils,  no fevers     Review of Systems   Constitutio mouth 2 (two) times daily for 7 days.        Imaging & Referrals:  None       #[id number]

## 2020-01-09 ENCOUNTER — OFFICE VISIT (OUTPATIENT)
Dept: FAMILY MEDICINE CLINIC | Facility: CLINIC | Age: 55
End: 2020-01-09
Payer: COMMERCIAL

## 2020-01-09 ENCOUNTER — TELEPHONE (OUTPATIENT)
Dept: FAMILY MEDICINE CLINIC | Facility: CLINIC | Age: 55
End: 2020-01-09

## 2020-01-09 VITALS
HEIGHT: 70 IN | TEMPERATURE: 98 F | SYSTOLIC BLOOD PRESSURE: 152 MMHG | BODY MASS INDEX: 37.8 KG/M2 | DIASTOLIC BLOOD PRESSURE: 79 MMHG | WEIGHT: 264 LBS | HEART RATE: 82 BPM

## 2020-01-09 DIAGNOSIS — Z00.00 ROUTINE GENERAL MEDICAL EXAMINATION AT A HEALTH CARE FACILITY: ICD-10-CM

## 2020-01-09 DIAGNOSIS — G47.33 OSA (OBSTRUCTIVE SLEEP APNEA): ICD-10-CM

## 2020-01-09 DIAGNOSIS — Z12.11 SCREENING FOR COLON CANCER: ICD-10-CM

## 2020-01-09 DIAGNOSIS — I10 ESSENTIAL HYPERTENSION: Primary | ICD-10-CM

## 2020-01-09 PROBLEM — G47.30 SLEEP APNEA: Status: ACTIVE | Noted: 2020-01-09

## 2020-01-09 PROCEDURE — 99396 PREV VISIT EST AGE 40-64: CPT | Performed by: FAMILY MEDICINE

## 2020-01-09 RX ORDER — LISINOPRIL 20 MG/1
TABLET ORAL
Qty: 90 TABLET | Refills: 3 | Status: SHIPPED | OUTPATIENT
Start: 2020-01-09 | End: 2020-03-13

## 2020-01-09 NOTE — TELEPHONE ENCOUNTER
Patient stated   lisinopril 20 MG Oral Tab 90 tablet 3 1/9/2020    Sig:   TAKE 1 TABLET BY MOUTH ONCE DAILY     Route:   (none)     Note to Pharmacy:   12/10 refilled [id number].  Need follow up and blood work       Was sent to the wrong pharmacy and he does n

## 2020-01-09 NOTE — PROGRESS NOTES
HPI:    Patient ID: Beata Cintron is a 47year old male.     HPI  Patient presents with:  Lab Results: follow up for lab results   Referral: for colonoscopy, would like other options     Past Medical History:   Diagnosis Date   • Essential hypertension    • Fundoscopic exam:       The right eye shows no hemorrhage and no papilledema. The left eye shows no hemorrhage and no papilledema. Neck: Trachea normal and normal range of motion. Neck supple. Normal carotid pulses and no JVD present.    Shree Innocent

## 2020-03-13 ENCOUNTER — TELEPHONE (OUTPATIENT)
Dept: FAMILY MEDICINE CLINIC | Facility: CLINIC | Age: 55
End: 2020-03-13

## 2020-03-13 RX ORDER — LISINOPRIL 20 MG/1
TABLET ORAL
Qty: 90 TABLET | Refills: 1 | Status: SHIPPED | OUTPATIENT
Start: 2020-03-13 | End: 2020-09-09

## 2020-03-13 NOTE — TELEPHONE ENCOUNTER
Refill passed per [facility] protocol.   Hypertensive Medications  Protocol Criteria:  · Appointment scheduled in the past 6 months or in the next 3 months  · BMP or CMP in the past 12 months  · Creatinine result < 2  Recent Outpatient Visits

## 2020-03-13 NOTE — TELEPHONE ENCOUNTER
Patient requesting refill sent to [facility] on chart.  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

## 2020-09-08 ENCOUNTER — TELEPHONE (OUTPATIENT)
Dept: FAMILY MEDICINE CLINIC | Facility: CLINIC | Age: 55
End: 2020-09-08

## 2020-09-09 RX ORDER — LISINOPRIL 20 MG/1
TABLET ORAL
Qty: 90 TABLET | Refills: 0 | Status: SHIPPED | OUTPATIENT
Start: 2020-09-09 | End: 2020-12-11

## 2020-09-11 RX ORDER — SILDENAFIL CITRATE 20 MG/1
TABLET ORAL
Qty: 50 TABLET | Refills: 0 | Status: SHIPPED | OUTPATIENT
Start: 2020-09-11 | End: 2020-12-11

## 2020-09-11 NOTE — TELEPHONE ENCOUNTER
I spoke to patient and let him know lisinopril 20 mg was refilled on 9-9-20. I told him I will call [facility] to see if there is any issues.     Spoke to [employer] and they state its ready for . [employer] will call the patient and let him know

## 2020-11-06 ENCOUNTER — OFFICE VISIT (OUTPATIENT)
Dept: FAMILY MEDICINE CLINIC | Facility: CLINIC | Age: 55
End: 2020-11-06
Payer: COMMERCIAL

## 2020-11-06 VITALS
SYSTOLIC BLOOD PRESSURE: 130 MMHG | WEIGHT: 264 LBS | BODY MASS INDEX: 37.8 KG/M2 | HEIGHT: 70 IN | HEART RATE: 87 BPM | DIASTOLIC BLOOD PRESSURE: 82 MMHG

## 2020-11-06 DIAGNOSIS — G47.33 OSA (OBSTRUCTIVE SLEEP APNEA): ICD-10-CM

## 2020-11-06 DIAGNOSIS — Z00.00 ROUTINE GENERAL MEDICAL EXAMINATION AT A HEALTH CARE FACILITY: ICD-10-CM

## 2020-11-06 DIAGNOSIS — I10 ESSENTIAL HYPERTENSION: Primary | ICD-10-CM

## 2020-11-06 PROCEDURE — 3079F DIAST BP 80-89 MM HG: CPT | Performed by: FAMILY MEDICINE

## 2020-11-06 PROCEDURE — 99213 OFFICE O/P EST LOW 20 MIN: CPT | Performed by: FAMILY MEDICINE

## 2020-11-06 PROCEDURE — 3008F BODY MASS INDEX DOCD: CPT | Performed by: FAMILY MEDICINE

## 2020-11-06 PROCEDURE — 99072 ADDL SUPL MATRL&STAF TM PHE: CPT | Performed by: FAMILY MEDICINE

## 2020-11-06 PROCEDURE — 3075F SYST BP GE 130 - 139MM HG: CPT | Performed by: FAMILY MEDICINE

## 2020-11-06 RX ORDER — CHLORTHALIDONE 50 MG/1
TABLET ORAL
COMMUNITY
Start: 2020-11-04 | End: 2021-03-12

## 2020-11-06 NOTE — PROGRESS NOTES
HPI:    Patient ID: Esther Fisher is a 47year old male. HPI  Patient presents with:  HTN: follow up for HTN     Review of Systems   Constitutional: Negative. Respiratory: Negative. Cardiovascular: Negative.              Current Outpatient [facility]

## 2020-12-11 RX ORDER — SILDENAFIL CITRATE 20 MG/1
TABLET ORAL
Qty: 30 TABLET | Refills: 3 | Status: SHIPPED | OUTPATIENT
Start: 2020-12-11 | End: 2021-03-12

## 2020-12-11 RX ORDER — LISINOPRIL 20 MG/1
TABLET ORAL
Qty: 90 TABLET | Refills: 0 | Status: SHIPPED | OUTPATIENT
Start: 2020-12-11 | End: 2020-12-19

## 2020-12-15 ENCOUNTER — TELEPHONE (OUTPATIENT)
Dept: FAMILY MEDICINE CLINIC | Facility: CLINIC | Age: 55
End: 2020-12-15

## 2020-12-15 NOTE — TELEPHONE ENCOUNTER
Per patient's spouse, pharmacy has not received the script for lisinopril. [address]    lisinopril 20 MG Oral Tab 90 tablet 0 12/11/2020     Sig: TAKE 1 TABLET BY MOUTH ONCE DAILY.  PLEASE SCHEDULE A FOLL

## 2020-12-15 NOTE — TELEPHONE ENCOUNTER
Ramona Mclean to verify message below since Rx sent 12/11/20 shows as being received. Spoke with Carmen Guerrier who verifies Rx was not received; verbal given as signed by Dr Uri Hamilton on 12/11/20.

## 2020-12-19 RX ORDER — LISINOPRIL 20 MG/1
TABLET ORAL
Qty: 90 TABLET | Refills: 0 | Status: SHIPPED
Start: 2020-12-19 | End: 2021-03-12

## 2021-03-05 ENCOUNTER — LAB ENCOUNTER (OUTPATIENT)
Dept: LAB | Age: 56
End: 2021-03-05
Attending: FAMILY MEDICINE
Payer: COMMERCIAL

## 2021-03-05 DIAGNOSIS — Z00.00 ROUTINE GENERAL MEDICAL EXAMINATION AT A HEALTH CARE FACILITY: ICD-10-CM

## 2021-03-05 LAB
ALBUMIN SERPL-MCNC: 4.1 G/DL (ref 3.4–5)
ALBUMIN/GLOB SERPL: 1.5 {RATIO} (ref 1–2)
ALP LIVER SERPL-CCNC: 79 U/L
ALT SERPL-CCNC: 26 U/L
ANION GAP SERPL CALC-SCNC: 8 MMOL/L (ref 0–18)
AST SERPL-CCNC: 12 U/L (ref 15–37)
BASOPHILS # BLD AUTO: 0.04 X10(3) UL (ref 0–0.2)
BASOPHILS NFR BLD AUTO: 0.7 %
BILIRUB SERPL-MCNC: 0.3 MG/DL (ref 0.1–2)
BILIRUB UR QL: NEGATIVE
BUN BLD-MCNC: 19 MG/DL (ref 7–18)
BUN/CREAT SERPL: 16.8 (ref 10–20)
CALCIUM BLD-MCNC: 9.1 MG/DL (ref 8.5–10.1)
CHLORIDE SERPL-SCNC: 106 MMOL/L (ref 98–112)
CHOLEST SMN-MCNC: 172 MG/DL (ref ?–200)
CLARITY UR: CLEAR
CO2 SERPL-SCNC: 25 MMOL/L (ref 21–32)
COLOR UR: YELLOW
COMPLEXED PSA SERPL-MCNC: 2.75 NG/ML (ref ?–4)
CREAT BLD-MCNC: 1.13 MG/DL
DEPRECATED RDW RBC AUTO: 45.4 FL (ref 35.1–46.3)
EOSINOPHIL # BLD AUTO: 0.26 X10(3) UL (ref 0–0.7)
EOSINOPHIL NFR BLD AUTO: 4.5 %
ERYTHROCYTE [DISTWIDTH] IN BLOOD BY AUTOMATED COUNT: 12.9 % (ref 11–15)
GLOBULIN PLAS-MCNC: 2.8 G/DL (ref 2.8–4.4)
GLUCOSE BLD-MCNC: 100 MG/DL (ref 70–99)
GLUCOSE UR-MCNC: NEGATIVE MG/DL
HCT VFR BLD AUTO: 41.4 %
HDLC SERPL-MCNC: 51 MG/DL (ref 40–59)
HGB BLD-MCNC: 13.5 G/DL
HGB UR QL STRIP.AUTO: NEGATIVE
IMM GRANULOCYTES # BLD AUTO: 0.05 X10(3) UL (ref 0–1)
IMM GRANULOCYTES NFR BLD: 0.9 %
KETONES UR-MCNC: NEGATIVE MG/DL
LDLC SERPL CALC-MCNC: 88 MG/DL (ref ?–100)
LEUKOCYTE ESTERASE UR QL STRIP.AUTO: NEGATIVE
LYMPHOCYTES # BLD AUTO: 2.47 X10(3) UL (ref 1–4)
LYMPHOCYTES NFR BLD AUTO: 42.3 %
M PROTEIN MFR SERPL ELPH: 6.9 G/DL (ref 6.4–8.2)
MCH RBC QN AUTO: 31 PG (ref 26–34)
MCHC RBC AUTO-ENTMCNC: 32.6 G/DL (ref 31–37)
MCV RBC AUTO: 95.2 FL
MONOCYTES # BLD AUTO: 0.56 X10(3) UL (ref 0.1–1)
MONOCYTES NFR BLD AUTO: 9.6 %
NEUTROPHILS # BLD AUTO: 2.46 X10 (3) UL (ref 1.5–7.7)
NEUTROPHILS # BLD AUTO: 2.46 X10(3) UL (ref 1.5–7.7)
NEUTROPHILS NFR BLD AUTO: 42 %
NITRITE UR QL STRIP.AUTO: NEGATIVE
NONHDLC SERPL-MCNC: 121 MG/DL (ref ?–130)
OSMOLALITY SERPL CALC.SUM OF ELEC: 290 MOSM/KG (ref 275–295)
PATIENT FASTING Y/N/NP: YES
PATIENT FASTING Y/N/NP: YES
PH UR: 5 [PH] (ref 5–8)
PLATELET # BLD AUTO: 230 10(3)UL (ref 150–450)
POTASSIUM SERPL-SCNC: 4.6 MMOL/L (ref 3.5–5.1)
PROT UR-MCNC: NEGATIVE MG/DL
RBC # BLD AUTO: 4.35 X10(6)UL
SODIUM SERPL-SCNC: 139 MMOL/L (ref 136–145)
SP GR UR STRIP: 1.02 (ref 1–1.03)
TRIGL SERPL-MCNC: 166 MG/DL (ref 30–149)
UROBILINOGEN UR STRIP-ACNC: <2
VLDLC SERPL CALC-MCNC: 33 MG/DL (ref 0–30)
WBC # BLD AUTO: 5.8 X10(3) UL (ref 4–11)

## 2021-03-05 PROCEDURE — 81003 URINALYSIS AUTO W/O SCOPE: CPT

## 2021-03-05 PROCEDURE — 80061 LIPID PANEL: CPT

## 2021-03-05 PROCEDURE — 36415 COLL VENOUS BLD VENIPUNCTURE: CPT

## 2021-03-05 PROCEDURE — 85025 COMPLETE CBC W/AUTO DIFF WBC: CPT

## 2021-03-05 PROCEDURE — 80053 COMPREHEN METABOLIC PANEL: CPT

## 2021-03-12 ENCOUNTER — OFFICE VISIT (OUTPATIENT)
Dept: FAMILY MEDICINE CLINIC | Facility: CLINIC | Age: 56
End: 2021-03-12
Payer: COMMERCIAL

## 2021-03-12 VITALS
HEIGHT: 70 IN | WEIGHT: 263 LBS | HEART RATE: 70 BPM | DIASTOLIC BLOOD PRESSURE: 84 MMHG | SYSTOLIC BLOOD PRESSURE: 147 MMHG | BODY MASS INDEX: 37.65 KG/M2

## 2021-03-12 DIAGNOSIS — Z12.11 SCREENING FOR COLON CANCER: Primary | ICD-10-CM

## 2021-03-12 DIAGNOSIS — I10 ESSENTIAL HYPERTENSION: ICD-10-CM

## 2021-03-12 DIAGNOSIS — Z00.00 ROUTINE GENERAL MEDICAL EXAMINATION AT A HEALTH CARE FACILITY: ICD-10-CM

## 2021-03-12 PROCEDURE — 3077F SYST BP >= 140 MM HG: CPT | Performed by: FAMILY MEDICINE

## 2021-03-12 PROCEDURE — 3008F BODY MASS INDEX DOCD: CPT | Performed by: FAMILY MEDICINE

## 2021-03-12 PROCEDURE — 99396 PREV VISIT EST AGE 40-64: CPT | Performed by: FAMILY MEDICINE

## 2021-03-12 PROCEDURE — 3079F DIAST BP 80-89 MM HG: CPT | Performed by: FAMILY MEDICINE

## 2021-03-12 RX ORDER — SILDENAFIL CITRATE 20 MG/1
TABLET ORAL
Qty: 30 TABLET | Refills: 3 | Status: SHIPPED | OUTPATIENT
Start: 2021-03-12

## 2021-03-12 RX ORDER — LISINOPRIL 20 MG/1
TABLET ORAL
Qty: 90 TABLET | Refills: 3 | Status: SHIPPED | OUTPATIENT
Start: 2021-03-12
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
## 2022-03-17 ENCOUNTER — LAB ENCOUNTER (OUTPATIENT)
Dept: LAB | Age: 57
End: 2022-03-17
Attending: FAMILY MEDICINE
Payer: COMMERCIAL

## 2022-03-17 ENCOUNTER — TELEPHONE (OUTPATIENT)
Dept: FAMILY MEDICINE CLINIC | Facility: CLINIC | Age: 57
End: 2022-03-17

## 2022-03-17 ENCOUNTER — OFFICE VISIT (OUTPATIENT)
Dept: FAMILY MEDICINE CLINIC | Facility: CLINIC | Age: 57
End: 2022-03-17
Payer: COMMERCIAL

## 2022-03-17 VITALS
DIASTOLIC BLOOD PRESSURE: 84 MMHG | BODY MASS INDEX: 37.08 KG/M2 | HEART RATE: 77 BPM | HEIGHT: 70 IN | SYSTOLIC BLOOD PRESSURE: 144 MMHG | WEIGHT: 259 LBS

## 2022-03-17 DIAGNOSIS — Z00.00 ROUTINE GENERAL MEDICAL EXAMINATION AT A HEALTH CARE FACILITY: ICD-10-CM

## 2022-03-17 DIAGNOSIS — I10 ESSENTIAL HYPERTENSION: ICD-10-CM

## 2022-03-17 DIAGNOSIS — Z12.11 COLON CANCER SCREENING: ICD-10-CM

## 2022-03-17 DIAGNOSIS — Z00.00 ROUTINE GENERAL MEDICAL EXAMINATION AT A HEALTH CARE FACILITY: Primary | ICD-10-CM

## 2022-03-17 DIAGNOSIS — G47.33 OSA (OBSTRUCTIVE SLEEP APNEA): ICD-10-CM

## 2022-03-17 PROBLEM — E66.01 MORBID (SEVERE) OBESITY DUE TO EXCESS CALORIES (HCC): Status: ACTIVE | Noted: 2022-03-17

## 2022-03-17 LAB
ALBUMIN SERPL-MCNC: 3.9 G/DL (ref 3.4–5)
ALBUMIN/GLOB SERPL: 1.6 {RATIO} (ref 1–2)
ALP LIVER SERPL-CCNC: 84 U/L
ALT SERPL-CCNC: 29 U/L
ANION GAP SERPL CALC-SCNC: 6 MMOL/L (ref 0–18)
AST SERPL-CCNC: 15 U/L (ref 15–37)
BASOPHILS # BLD AUTO: 0.04 X10(3) UL (ref 0–0.2)
BASOPHILS NFR BLD AUTO: 0.8 %
BILIRUB SERPL-MCNC: 0.4 MG/DL (ref 0.1–2)
BILIRUB UR QL: NEGATIVE
BUN BLD-MCNC: 13 MG/DL (ref 7–18)
BUN/CREAT SERPL: 13.4 (ref 10–20)
CALCIUM BLD-MCNC: 9.2 MG/DL (ref 8.5–10.1)
CHLORIDE SERPL-SCNC: 106 MMOL/L (ref 98–112)
CHOLEST SERPL-MCNC: 143 MG/DL (ref ?–200)
CLARITY UR: CLEAR
CO2 SERPL-SCNC: 28 MMOL/L (ref 21–32)
COLOR UR: YELLOW
COMPLEXED PSA SERPL-MCNC: 2.5 NG/ML (ref ?–4)
CREAT BLD-MCNC: 0.97 MG/DL
DEPRECATED RDW RBC AUTO: 47.5 FL (ref 35.1–46.3)
EOSINOPHIL # BLD AUTO: 0.16 X10(3) UL (ref 0–0.7)
EOSINOPHIL NFR BLD AUTO: 3.4 %
ERYTHROCYTE [DISTWIDTH] IN BLOOD BY AUTOMATED COUNT: 13.2 % (ref 11–15)
FASTING PATIENT LIPID ANSWER: NO
FASTING STATUS PATIENT QL REPORTED: NO
GLOBULIN PLAS-MCNC: 2.4 G/DL (ref 2.8–4.4)
GLUCOSE BLD-MCNC: 89 MG/DL (ref 70–99)
GLUCOSE UR-MCNC: NEGATIVE MG/DL
HCT VFR BLD AUTO: 41.7 %
HDLC SERPL-MCNC: 48 MG/DL (ref 40–59)
HGB BLD-MCNC: 13.6 G/DL
HGB UR QL STRIP.AUTO: NEGATIVE
KETONES UR-MCNC: NEGATIVE MG/DL
LDLC SERPL CALC-MCNC: 76 MG/DL (ref ?–100)
LEUKOCYTE ESTERASE UR QL STRIP.AUTO: NEGATIVE
LYMPHOCYTES # BLD AUTO: 1.45 X10(3) UL (ref 1–4)
LYMPHOCYTES NFR BLD AUTO: 30.6 %
MCH RBC QN AUTO: 31.4 PG (ref 26–34)
MCHC RBC AUTO-ENTMCNC: 32.6 G/DL (ref 31–37)
MCV RBC AUTO: 96.3 FL
MONOCYTES NFR BLD AUTO: 16.7 %
NEUTROPHILS # BLD AUTO: 2.28 X10 (3) UL (ref 1.5–7.7)
NEUTROPHILS # BLD AUTO: 2.28 X10(3) UL (ref 1.5–7.7)
NEUTROPHILS NFR BLD AUTO: 48.1 %
NITRITE UR QL STRIP.AUTO: NEGATIVE
NONHDLC SERPL-MCNC: 95 MG/DL (ref ?–130)
OSMOLALITY SERPL CALC.SUM OF ELEC: 290 MOSM/KG (ref 275–295)
PH UR: 6 [PH] (ref 5–8)
POTASSIUM SERPL-SCNC: 4.7 MMOL/L (ref 3.5–5.1)
PROT SERPL-MCNC: 6.3 G/DL (ref 6.4–8.2)
PROT UR-MCNC: NEGATIVE MG/DL
RBC # BLD AUTO: 4.33 X10(6)UL
SODIUM SERPL-SCNC: 140 MMOL/L (ref 136–145)
TRIGL SERPL-MCNC: 106 MG/DL (ref 30–149)
UROBILINOGEN UR STRIP-ACNC: <2
VIT C UR-MCNC: NEGATIVE MG/DL
VLDLC SERPL CALC-MCNC: 16 MG/DL (ref 0–30)
WBC # BLD AUTO: 4.7 X10(3) UL (ref 4–11)

## 2022-03-17 PROCEDURE — 80053 COMPREHEN METABOLIC PANEL: CPT

## 2022-03-17 PROCEDURE — 85025 COMPLETE CBC W/AUTO DIFF WBC: CPT

## 2022-03-17 PROCEDURE — 3077F SYST BP >= 140 MM HG: CPT | Performed by: FAMILY MEDICINE

## 2022-03-17 PROCEDURE — 80061 LIPID PANEL: CPT

## 2022-03-17 PROCEDURE — 3079F DIAST BP 80-89 MM HG: CPT | Performed by: FAMILY MEDICINE

## 2022-03-17 PROCEDURE — 3008F BODY MASS INDEX DOCD: CPT | Performed by: FAMILY MEDICINE

## 2022-03-17 PROCEDURE — 81003 URINALYSIS AUTO W/O SCOPE: CPT

## 2022-03-17 PROCEDURE — 36415 COLL VENOUS BLD VENIPUNCTURE: CPT

## 2022-03-17 PROCEDURE — 99396 PREV VISIT EST AGE 40-64: CPT | Performed by: FAMILY MEDICINE

## 2022-03-17 RX ORDER — SILDENAFIL CITRATE 20 MG/1
TABLET ORAL
Qty: 50 TABLET | Refills: 3 | Status: SHIPPED | OUTPATIENT
Start: 2022-03-17

## 2022-03-17 RX ORDER — LISINOPRIL 20 MG/1
TABLET ORAL
Qty: 90 TABLET | Refills: 1 | Status: SHIPPED | OUTPATIENT
Start: 2022-03-17

## 2022-03-17 NOTE — TELEPHONE ENCOUNTER
Pt calling stating he was suppose to get refill of lisinopril 20 MG Oral Tab     Today from    He will be out by tomorrow. Needs to go to [place]  Please advise.

## 2022-06-15 RX ORDER — LISINOPRIL 20 MG/1
TABLET ORAL
Qty: 90 TABLET | Refills: 1 | OUTPATIENT
Start: 2022-06-15

## 2022-09-08 NOTE — TELEPHONE ENCOUNTER
Please review.  Protocol Failed or has no Protocol  Requested Prescriptions   Pending Prescriptions Disp Refills    lisinopril 20 MG Oral Tab 90 tablet 1     Sig: TAKE 1 TABLET BY MOUTH ONCE DAILY        Hypertensive Medications Protocol Failed - 9/8/2022  9:53 AM        Failed - Last BP reading less than 140/90     BP Readings from Last 1 Encounters:  03/17/22 : 144/84                Passed - In person appointment in the past 12 or next 3 months       Recent Outpatient Visits              5 months ago Routine general medical examination at a health care facility    [facility], [address] [place] [address]    Office Visit    1 year ago Screening for colon cancer    [facility], [address] [place] [address],     Office Visit    1 year ago Essential hypertension    [facility], [address] [place] [address],     Office Visit    2 years ago Essential hypertension    [facility], [address] [place] [address], [address] [place] [address]    2 years ago Routine general medical examination at a health care facility    [facility], [address] [place] [address], [facility] Visit     Future Appointments         Provider Department Appt Notes    In 2 weeks John Al MD [facility], [address] [place] [address] screening                Passed - CMP or BMP in past 6 months     Recent Results (from the past 4392 hour(s))   COMP METABOLIC PANEL (14)    Collection Time: 03/17/22 11:05 AM   Result Value Ref Range    Glucose 89 70 - 99 mg/dL    Sodium 140 136 - 145 mmol/L    Potassium 4.7 3.5 - 5.1 mmol/L    Chloride 106 98 - 112 mmol/L    CO2 28.0 21.0 - 32.0 mmol/L    Anion Gap 6 0 - 18 mmol/L    BUN 13 7 - 18 mg/dL    Creatinine 0.97 0.70 - 1.30 mg/dL    BUN/CREA Ratio 13.4 10.0 - 20.0    Calcium, Total 9.2 8.5 - 10.1 mg/dL    Calculated Osmolality 290 275 - 295 mOsm/kg    GFR, Non- 87 >=60    GFR, -American 100 >=60    ALT 29 16 - 61 U/L    AST 15 15 - 37 U/L    Alkaline Phosphatase 84 45 - 117 U/L    Bilirubin, Total 0.4 0.1 - 2.0 mg/dL    Total Protein 6.3 (L) 6.4 - 8.2 g/dL    Albumin 3.9 3.4 - 5.0 g/dL    Globulin  2.4 (L) 2.8 - 4.4 g/dL    A/G Ratio 1.6 1.0 - 2.0    Patient Fasting for CMP? No      *Note: Due to a large number of results and/or encounters for the requested time period, some results have not been displayed. A complete set of results can be found in Results Review.                  Passed - In person appointment or virtual visit in the past 6 months       Recent Outpatient Visits              5 months ago Routine general medical examination at a health care facility    [facility], [address] [place] [address]    Office Visit    1 year ago Screening for colon cancer    [facility], [address] [place] [address],     Office Visit    1 year ago Essential hypertension    [facility], [address] [place] [address],     Office Visit    2 years ago Essential hypertension    [facility], [address] [place] [address],     Office Visit    2 years ago Routine general medical examination at a health care facility    [facility], [address] [place] [address], [address] Visit     Future Appointments         Provider Department Appt Notes    In 2 weeks Kirsten Henderson MD [facility], [address] [place] [address] screening                Passed - GFR > 50     No results found for: [facility]                  Future Appointments         Provider Department Appt Notes    In 2 weeks Kirsten Henderson MD [facility], [address] [place] [address] 7Th screening           Recent Outpatient Visits              5 months ago Routine general medical examination at a health care facility    [facility], [address] [place] [address], [address] [place] [address]    1 year ago Screening for colon cancer    [facility], [address]    Office Visit    1 year ago Essential hypertension    [facility] [address] [facility],     Office Visit    2 years ago Essential hypertension    [facility] [address] [facility],     Office Visit    2 years ago Routine general medical examination at a health care facility    [facility], [address]    Office Visit

## 2022-09-09 RX ORDER — LISINOPRIL 20 MG/1
TABLET ORAL
Qty: 90 TABLET | Refills: 0 | Status: SHIPPED | OUTPATIENT
Start: 2022-09-09 | End: 2023-01-20

## 2022-09-09 NOTE — TELEPHONE ENCOUNTER
90 day refill given for Dr. Chino Gamino  Please call patient to schedule follow-up with Dr. Chino Gamino for further refills.

## 2022-09-28 ENCOUNTER — OFFICE VISIT (OUTPATIENT)
Dept: GASTROENTEROLOGY | Facility: CLINIC | Age: 57
End: 2022-09-28

## 2022-09-28 ENCOUNTER — TELEPHONE (OUTPATIENT)
Dept: GASTROENTEROLOGY | Facility: CLINIC | Age: 57
End: 2022-09-28

## 2022-09-28 VITALS
SYSTOLIC BLOOD PRESSURE: 151 MMHG | BODY MASS INDEX: 36.51 KG/M2 | DIASTOLIC BLOOD PRESSURE: 100 MMHG | HEART RATE: 79 BPM | WEIGHT: 255 LBS | HEIGHT: 70 IN

## 2022-09-28 DIAGNOSIS — Z12.11 COLON CANCER SCREENING: Primary | ICD-10-CM

## 2022-09-28 DIAGNOSIS — Z12.11 SCREENING FOR COLON CANCER: Primary | ICD-10-CM

## 2022-09-28 PROCEDURE — 3080F DIAST BP >= 90 MM HG: CPT | Performed by: STUDENT IN AN ORGANIZED HEALTH CARE EDUCATION/TRAINING PROGRAM

## 2022-09-28 PROCEDURE — S0285 CNSLT BEFORE SCREEN COLONOSC: HCPCS | Performed by: STUDENT IN AN ORGANIZED HEALTH CARE EDUCATION/TRAINING PROGRAM

## 2022-09-28 PROCEDURE — 3077F SYST BP >= 140 MM HG: CPT | Performed by: STUDENT IN AN ORGANIZED HEALTH CARE EDUCATION/TRAINING PROGRAM

## 2022-09-28 PROCEDURE — 3008F BODY MASS INDEX DOCD: CPT | Performed by: STUDENT IN AN ORGANIZED HEALTH CARE EDUCATION/TRAINING PROGRAM

## 2022-09-28 RX ORDER — SODIUM, POTASSIUM,MAG SULFATES 17.5-3.13G
SOLUTION, RECONSTITUTED, ORAL ORAL
Qty: 1 EACH | Refills: 0 | Status: SHIPPED | OUTPATIENT
Start: 2022-09-28

## 2022-09-28 NOTE — TELEPHONE ENCOUNTER
Scheduled for:  Colonoscopy [address]  Provider Name: Jesse Paulson    Date: 12/15/22   Location:  [facility]  Sedation:  MAC  Time:  2:00 pm, (pt is aware that [facility] will call the day before to confirm arrival time)  Prep: Suprep/ Golytely   Meds/Allergies Reconciled?:  Physician reviewed  Diagnosis with codes:  Screening for Colon Cancer Z12.11  Was patient informed to call insurance with codes (Y/N):  Yes  Referral sent?: Yes   [facility] or [facility] [address]  notified?: Electronic case request was sent to [facility] via [employer]. Medication Orders: Hold Lisinopril day of the procedure.   Misc Orders:  N/A     Further instructions given by staff:

## 2022-09-28 NOTE — PATIENT INSTRUCTIONS
1. Schedule colonoscopy with MAC [Diagnosis: Screening]    2.  bowel prep from pharmacy (Split dose suprep, if suprep not covered then golytely or moviprep)    3. Continue all medications for procedure    4. Read all bowel prep instructions carefully    5. AVOID seeds, nuts, popcorn, raw fruits and vegetables (cooked is okay) for 2-3 days before procedure    6. You MAY need to go for COVID testing 72 hours before procedure. The testing team will call you a few days before your procedure to discuss with you if testing is required. If you are asked to go for COVID testing and do not completed the test, the procedure cannot be performed. 7. If you start any NEW medication after your visit today, please notify us. Certain medications will need to be held before the procedure, or the procedure cannot be performed.

## 2022-12-16 ENCOUNTER — SURGERY CENTER DOCUMENTATION (OUTPATIENT)
Dept: SURGERY | Age: 57
End: 2022-12-16

## 2022-12-20 ENCOUNTER — TELEPHONE (OUTPATIENT)
Dept: GASTROENTEROLOGY | Facility: CLINIC | Age: 57
End: 2022-12-20

## 2022-12-20 NOTE — TELEPHONE ENCOUNTER
Health maintenance updated. Last colonoscopy done 12/16/22. 3 year recall placed into Pt Outreach, next due on 12/16/25 per Dr. Anastasia Zaldivar.

## 2022-12-20 NOTE — TELEPHONE ENCOUNTER
GI Staff:    Can you please place recall for this patient to have a colonoscopy in 3 years. Thank you.     Louise Schuster MD

## 2022-12-22 ENCOUNTER — TELEPHONE (OUTPATIENT)
Dept: GASTROENTEROLOGY | Facility: CLINIC | Age: 57
End: 2022-12-22

## 2023-01-20 NOTE — PROGRESS NOTES
HPI:    Patient ID: Hunter Brooks is a 46year old male. HPI  Patient presents with:  Hypertension: needs clearance for DOT card  Not taking medication. Review of Systems   Constitutional: Negative. Respiratory: Negative.     Cardiovascular: Negative 67 yo male, PMH anxiety/depression, GENEVA (non-compliant) NPH since 2020 diagnosed when he had gait changes, but decided not to pursue treatment due to pandemic, since then his symptoms have worsened, with staggering gait, s/p Lumbar drain 11/2022. Pt states drain initially worked great, but with in 3 days, all s/s returned. Pt. presents to PST for scheduled  shunt on 1/19/23. Pt. denies COVID-19 infection in 2020 through 2022, denies close contact with anyone that has been ill, no fever, cough, dyspnea in past two weeks.    Post-op day # 1 s/p VPS, Post-op Ct Head stable. Patient condition is stable for discharge home 69 yo male, PMH anxiety/depression, GENEVA (non-compliant) NPH since 2020 diagnosed when he had gait changes, but decided not to pursue treatment due to pandemic, since then his symptoms have worsened, with staggering gait, s/p Lumbar drain 11/2022. Pt states drain initially worked great, but with in 3 days, all s/s returned. Pt. presents to PST for scheduled  shunt on 1/19/23. Pt. denies COVID-19 infection in 2020 through 2022, denies close contact with anyone that has been ill, no fever, cough, dyspnea in past two weeks.    Post-op day # 1 s/p VPS, Post-op Ct Head stable. He has been ambulating around. He is stable for d/c home today

## 2023-01-24 RX ORDER — LISINOPRIL 20 MG/1
TABLET ORAL
Qty: 90 TABLET | Refills: 0 | OUTPATIENT
Start: 2023-01-24

## 2023-03-30 ENCOUNTER — OFFICE VISIT (OUTPATIENT)
Dept: FAMILY MEDICINE CLINIC | Facility: CLINIC | Age: 58
End: 2023-03-30

## 2023-03-30 ENCOUNTER — LAB ENCOUNTER (OUTPATIENT)
Dept: LAB | Age: 58
End: 2023-03-30
Attending: FAMILY MEDICINE
Payer: COMMERCIAL

## 2023-03-30 VITALS
SYSTOLIC BLOOD PRESSURE: 138 MMHG | WEIGHT: 262.63 LBS | BODY MASS INDEX: 37.6 KG/M2 | HEART RATE: 65 BPM | DIASTOLIC BLOOD PRESSURE: 66 MMHG | HEIGHT: 70 IN | OXYGEN SATURATION: 98 %

## 2023-03-30 DIAGNOSIS — M17.32 POST-TRAUMATIC OSTEOARTHRITIS OF LEFT KNEE: ICD-10-CM

## 2023-03-30 DIAGNOSIS — Z87.891 FORMER TOBACCO USE: ICD-10-CM

## 2023-03-30 DIAGNOSIS — I10 ESSENTIAL HYPERTENSION: ICD-10-CM

## 2023-03-30 DIAGNOSIS — Z00.00 ROUTINE GENERAL MEDICAL EXAMINATION AT A HEALTH CARE FACILITY: ICD-10-CM

## 2023-03-30 DIAGNOSIS — Z00.00 ROUTINE GENERAL MEDICAL EXAMINATION AT A HEALTH CARE FACILITY: Primary | ICD-10-CM

## 2023-03-30 DIAGNOSIS — G47.33 OSA (OBSTRUCTIVE SLEEP APNEA): ICD-10-CM

## 2023-03-30 PROBLEM — D12.2 ADENOMATOUS POLYP OF ASCENDING COLON: Status: ACTIVE | Noted: 2023-03-30

## 2023-03-30 PROBLEM — M17.12 OSTEOARTHRITIS OF LEFT KNEE: Status: ACTIVE | Noted: 2023-03-30

## 2023-03-30 LAB
ALBUMIN SERPL-MCNC: 3.8 G/DL (ref 3.4–5)
ALBUMIN/GLOB SERPL: 1.3 {RATIO} (ref 1–2)
ALP LIVER SERPL-CCNC: 68 U/L
ALT SERPL-CCNC: 25 U/L
ANION GAP SERPL CALC-SCNC: 9 MMOL/L (ref 0–18)
AST SERPL-CCNC: 15 U/L (ref 15–37)
BASOPHILS # BLD AUTO: 0.04 X10(3) UL (ref 0–0.2)
BASOPHILS NFR BLD AUTO: 0.6 %
BILIRUB SERPL-MCNC: 0.3 MG/DL (ref 0.1–2)
BILIRUB UR QL: NEGATIVE
BUN BLD-MCNC: 17 MG/DL (ref 7–18)
BUN/CREAT SERPL: 16.8 (ref 10–20)
CALCIUM BLD-MCNC: 9.3 MG/DL (ref 8.5–10.1)
CHLORIDE SERPL-SCNC: 107 MMOL/L (ref 98–112)
CHOLEST SERPL-MCNC: 165 MG/DL (ref ?–200)
CLARITY UR: CLEAR
CO2 SERPL-SCNC: 26 MMOL/L (ref 21–32)
COLOR UR: COLORLESS
COMPLEXED PSA SERPL-MCNC: 2.63 NG/ML (ref ?–4)
CREAT BLD-MCNC: 1.01 MG/DL
DEPRECATED RDW RBC AUTO: 44.4 FL (ref 35.1–46.3)
EOSINOPHIL # BLD AUTO: 0.24 X10(3) UL (ref 0–0.7)
EOSINOPHIL NFR BLD AUTO: 3.4 %
ERYTHROCYTE [DISTWIDTH] IN BLOOD BY AUTOMATED COUNT: 13.1 % (ref 11–15)
FASTING PATIENT LIPID ANSWER: NO
FASTING STATUS PATIENT QL REPORTED: NO
GFR SERPLBLD BASED ON 1.73 SQ M-ARVRAT: 87 ML/MIN/1.73M2 (ref 60–?)
GLOBULIN PLAS-MCNC: 2.9 G/DL (ref 2.8–4.4)
GLUCOSE BLD-MCNC: 102 MG/DL (ref 70–99)
GLUCOSE UR-MCNC: NORMAL MG/DL
HCT VFR BLD AUTO: 39.4 %
HDLC SERPL-MCNC: 49 MG/DL (ref 40–59)
HGB BLD-MCNC: 13.4 G/DL
HGB UR QL STRIP.AUTO: NEGATIVE
IMM GRANULOCYTES # BLD AUTO: 0.06 X10(3) UL (ref 0–1)
IMM GRANULOCYTES NFR BLD: 0.9 %
KETONES UR-MCNC: NEGATIVE MG/DL
LDLC SERPL CALC-MCNC: 82 MG/DL (ref ?–100)
LEUKOCYTE ESTERASE UR QL STRIP.AUTO: NEGATIVE
LYMPHOCYTES # BLD AUTO: 2.72 X10(3) UL (ref 1–4)
LYMPHOCYTES NFR BLD AUTO: 38.9 %
MCH RBC QN AUTO: 31.9 PG (ref 26–34)
MCHC RBC AUTO-ENTMCNC: 34 G/DL (ref 31–37)
MCV RBC AUTO: 93.8 FL
MONOCYTES # BLD AUTO: 0.67 X10(3) UL (ref 0.1–1)
MONOCYTES NFR BLD AUTO: 9.6 %
NEUTROPHILS # BLD AUTO: 3.26 X10 (3) UL (ref 1.5–7.7)
NEUTROPHILS # BLD AUTO: 3.26 X10(3) UL (ref 1.5–7.7)
NEUTROPHILS NFR BLD AUTO: 46.6 %
NITRITE UR QL STRIP.AUTO: NEGATIVE
NONHDLC SERPL-MCNC: 116 MG/DL (ref ?–130)
OSMOLALITY SERPL CALC.SUM OF ELEC: 296 MOSM/KG (ref 275–295)
PH UR: 6.5 [PH] (ref 5–8)
PLATELET # BLD AUTO: 230 10(3)UL (ref 150–450)
POTASSIUM SERPL-SCNC: 4.6 MMOL/L (ref 3.5–5.1)
PROT SERPL-MCNC: 6.7 G/DL (ref 6.4–8.2)
PROT UR-MCNC: NEGATIVE MG/DL
RBC # BLD AUTO: 4.2 X10(6)UL
SODIUM SERPL-SCNC: 142 MMOL/L (ref 136–145)
SP GR UR STRIP: <1.005 (ref 1–1.03)
TRIGL SERPL-MCNC: 200 MG/DL (ref 30–149)
UROBILINOGEN UR STRIP-ACNC: NORMAL
VLDLC SERPL CALC-MCNC: 32 MG/DL (ref 0–30)
WBC # BLD AUTO: 7 X10(3) UL (ref 4–11)

## 2023-03-30 PROCEDURE — 99396 PREV VISIT EST AGE 40-64: CPT | Performed by: FAMILY MEDICINE

## 2023-03-30 PROCEDURE — 85025 COMPLETE CBC W/AUTO DIFF WBC: CPT

## 2023-03-30 PROCEDURE — 80061 LIPID PANEL: CPT

## 2023-03-30 PROCEDURE — 3008F BODY MASS INDEX DOCD: CPT | Performed by: FAMILY MEDICINE

## 2023-03-30 PROCEDURE — 80053 COMPREHEN METABOLIC PANEL: CPT

## 2023-03-30 PROCEDURE — 36415 COLL VENOUS BLD VENIPUNCTURE: CPT

## 2023-03-30 PROCEDURE — 3078F DIAST BP <80 MM HG: CPT | Performed by: FAMILY MEDICINE

## 2023-03-30 PROCEDURE — 3075F SYST BP GE 130 - 139MM HG: CPT | Performed by: FAMILY MEDICINE

## 2023-03-30 RX ORDER — LISINOPRIL 20 MG/1
TABLET ORAL
Qty: 90 TABLET | Refills: 3 | Status: SHIPPED | OUTPATIENT
Start: 2023-03-30

## 2023-03-30 RX ORDER — SILDENAFIL CITRATE 20 MG/1
TABLET ORAL
Qty: 50 TABLET | Refills: 3 | Status: SHIPPED | OUTPATIENT
Start: 2023-03-30

## 2023-06-28 ENCOUNTER — TELEPHONE (OUTPATIENT)
Dept: FAMILY MEDICINE CLINIC | Facility: CLINIC | Age: 58
End: 2023-06-28

## 2023-11-02 ENCOUNTER — OFFICE VISIT (OUTPATIENT)
Dept: DERMATOLOGY CLINIC | Facility: CLINIC | Age: 58
End: 2023-11-02
Payer: COMMERCIAL

## 2023-11-02 DIAGNOSIS — D18.01 CHERRY ANGIOMA: ICD-10-CM

## 2023-11-02 DIAGNOSIS — L57.0 MULTIPLE ACTINIC KERATOSES: ICD-10-CM

## 2023-11-02 DIAGNOSIS — D49.2 NEOPLASM OF UNSPECIFIED BEHAVIOR OF BONE, SOFT TISSUE, AND SKIN: ICD-10-CM

## 2023-11-02 DIAGNOSIS — L81.4 LENTIGINES: ICD-10-CM

## 2023-11-02 DIAGNOSIS — D22.9 MULTIPLE BENIGN NEVI: Primary | ICD-10-CM

## 2023-11-02 DIAGNOSIS — L82.1 SEBORRHEIC KERATOSES: ICD-10-CM

## 2023-11-02 PROCEDURE — 88305 TISSUE EXAM BY PATHOLOGIST: CPT | Performed by: STUDENT IN AN ORGANIZED HEALTH CARE EDUCATION/TRAINING PROGRAM

## 2023-11-02 PROCEDURE — 17003 DESTRUCT PREMALG LES 2-14: CPT | Performed by: STUDENT IN AN ORGANIZED HEALTH CARE EDUCATION/TRAINING PROGRAM

## 2023-11-02 PROCEDURE — 17000 DESTRUCT PREMALG LESION: CPT | Performed by: STUDENT IN AN ORGANIZED HEALTH CARE EDUCATION/TRAINING PROGRAM

## 2023-11-02 PROCEDURE — 99203 OFFICE O/P NEW LOW 30 MIN: CPT | Performed by: STUDENT IN AN ORGANIZED HEALTH CARE EDUCATION/TRAINING PROGRAM

## 2023-11-02 PROCEDURE — 11102 TANGNTL BX SKIN SINGLE LES: CPT | Performed by: STUDENT IN AN ORGANIZED HEALTH CARE EDUCATION/TRAINING PROGRAM
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
## 2023-11-06 NOTE — PROGRESS NOTES
Logged in path book and pmh. Pt informed of pathology results and recommendations and verbalized understanding.

## 2023-12-05 ENCOUNTER — MED REC SCAN ONLY (OUTPATIENT)
Dept: DERMATOLOGY CLINIC | Facility: CLINIC | Age: 58
End: 2023-12-05

## 2024-02-14 NOTE — TELEPHONE ENCOUNTER
Patient calling to request refill for lisinopril, stated insurance will only give 30 at a time, is now running low, verified pharmacy, asking for refill until physical.      Current Outpatient Medications   Medication Sig Dispense Refill    lisinopril 20 MG Oral Tab TAKE 1 TABLET BY MOUTH ONCE DAILY 90 tablet 3               5

## 2024-02-14 NOTE — TELEPHONE ENCOUNTER
Dr. Langley,    Please see message below     Medication pended for your review / approval for 30 day supply with one refill  ( until appointment )     Future Appointments   Date Time Provider Department Center   4/4/2024  9:00 AM David Langley DO [facility]

## 2024-02-15 RX ORDER — LISINOPRIL 20 MG/1
TABLET ORAL
Qty: 30 TABLET | Refills: 1 | Status: SHIPPED | OUTPATIENT
Start: 2024-02-15

## 2024-04-04 ENCOUNTER — OFFICE VISIT (OUTPATIENT)
Dept: FAMILY MEDICINE CLINIC | Facility: CLINIC | Age: 59
End: 2024-04-04
Payer: COMMERCIAL

## 2024-04-04 ENCOUNTER — LAB ENCOUNTER (OUTPATIENT)
Dept: LAB | Age: 59
End: 2024-04-04
Attending: FAMILY MEDICINE
Payer: COMMERCIAL

## 2024-04-04 VITALS
WEIGHT: 259.81 LBS | BODY MASS INDEX: 37.2 KG/M2 | DIASTOLIC BLOOD PRESSURE: 80 MMHG | HEIGHT: 70 IN | SYSTOLIC BLOOD PRESSURE: 130 MMHG | HEART RATE: 81 BPM

## 2024-04-04 DIAGNOSIS — Z00.00 ROUTINE GENERAL MEDICAL EXAMINATION AT A HEALTH CARE FACILITY: Primary | ICD-10-CM

## 2024-04-04 DIAGNOSIS — I10 ESSENTIAL HYPERTENSION: ICD-10-CM

## 2024-04-04 DIAGNOSIS — M17.32 POST-TRAUMATIC OSTEOARTHRITIS OF LEFT KNEE: ICD-10-CM

## 2024-04-04 DIAGNOSIS — Z87.891 FORMER TOBACCO USE: ICD-10-CM

## 2024-04-04 DIAGNOSIS — G47.33 OSA (OBSTRUCTIVE SLEEP APNEA): ICD-10-CM

## 2024-04-04 DIAGNOSIS — Z00.00 ROUTINE GENERAL MEDICAL EXAMINATION AT A HEALTH CARE FACILITY: ICD-10-CM

## 2024-04-04 LAB
ALBUMIN SERPL-MCNC: 4.7 G/DL (ref 3.2–4.8)
ALBUMIN/GLOB SERPL: 2 {RATIO} (ref 1–2)
ALP LIVER SERPL-CCNC: 75 U/L
ALT SERPL-CCNC: 19 U/L
ANION GAP SERPL CALC-SCNC: 7 MMOL/L (ref 0–18)
AST SERPL-CCNC: 16 U/L (ref ?–34)
BASOPHILS # BLD AUTO: 0.04 X10(3) UL (ref 0–0.2)
BASOPHILS NFR BLD AUTO: 0.3 %
BILIRUB SERPL-MCNC: 0.3 MG/DL (ref 0.3–1.2)
BILIRUB UR QL: NEGATIVE
BUN BLD-MCNC: 23 MG/DL (ref 9–23)
BUN/CREAT SERPL: 18.5 (ref 10–20)
CALCIUM BLD-MCNC: 9.8 MG/DL (ref 8.7–10.4)
CHLORIDE SERPL-SCNC: 104 MMOL/L (ref 98–112)
CHOLEST SERPL-MCNC: 187 MG/DL (ref ?–200)
CLARITY UR: CLEAR
CO2 SERPL-SCNC: 26 MMOL/L (ref 21–32)
COMPLEXED PSA SERPL-MCNC: 1.78 NG/ML (ref ?–4)
CREAT BLD-MCNC: 1.24 MG/DL
DEPRECATED RDW RBC AUTO: 45.8 FL (ref 35.1–46.3)
EGFRCR SERPLBLD CKD-EPI 2021: 67 ML/MIN/1.73M2 (ref 60–?)
EOSINOPHIL # BLD AUTO: 0.1 X10(3) UL (ref 0–0.7)
EOSINOPHIL NFR BLD AUTO: 0.6 %
ERYTHROCYTE [DISTWIDTH] IN BLOOD BY AUTOMATED COUNT: 13.2 % (ref 11–15)
FASTING PATIENT LIPID ANSWER: NO
FASTING STATUS PATIENT QL REPORTED: NO
GLOBULIN PLAS-MCNC: 2.3 G/DL (ref 2.8–4.4)
GLUCOSE BLD-MCNC: 94 MG/DL (ref 70–99)
GLUCOSE UR-MCNC: NORMAL MG/DL
HCT VFR BLD AUTO: 40 %
HDLC SERPL-MCNC: 54 MG/DL (ref 40–59)
HGB BLD-MCNC: 13.2 G/DL
HGB UR QL STRIP.AUTO: NEGATIVE
IMM GRANULOCYTES # BLD AUTO: 0.17 X10(3) UL (ref 0–1)
IMM GRANULOCYTES NFR BLD: 1.1 %
KETONES UR-MCNC: NEGATIVE MG/DL
LDLC SERPL CALC-MCNC: 107 MG/DL (ref ?–100)
LEUKOCYTE ESTERASE UR QL STRIP.AUTO: NEGATIVE
LYMPHOCYTES # BLD AUTO: 3.89 X10(3) UL (ref 1–4)
LYMPHOCYTES NFR BLD AUTO: 24.4 %
MCH RBC QN AUTO: 31.3 PG (ref 26–34)
MCHC RBC AUTO-ENTMCNC: 33 G/DL (ref 31–37)
MCV RBC AUTO: 94.8 FL
MONOCYTES # BLD AUTO: 1.79 X10(3) UL (ref 0.1–1)
MONOCYTES NFR BLD AUTO: 11.2 %
NEUTROPHILS # BLD AUTO: 9.97 X10 (3) UL (ref 1.5–7.7)
NEUTROPHILS # BLD AUTO: 9.97 X10(3) UL (ref 1.5–7.7)
NEUTROPHILS NFR BLD AUTO: 62.4 %
NITRITE UR QL STRIP.AUTO: NEGATIVE
NONHDLC SERPL-MCNC: 133 MG/DL (ref ?–130)
OSMOLALITY SERPL CALC.SUM OF ELEC: 287 MOSM/KG (ref 275–295)
PH UR: 5 [PH] (ref 5–8)
PLATELET # BLD AUTO: 246 10(3)UL (ref 150–450)
POTASSIUM SERPL-SCNC: 4.7 MMOL/L (ref 3.5–5.1)
PROT SERPL-MCNC: 7 G/DL (ref 5.7–8.2)
PROT UR-MCNC: NEGATIVE MG/DL
RBC # BLD AUTO: 4.22 X10(6)UL
SODIUM SERPL-SCNC: 137 MMOL/L (ref 136–145)
SP GR UR STRIP: 1.01 (ref 1–1.03)
TRIGL SERPL-MCNC: 150 MG/DL (ref 30–149)
UROBILINOGEN UR STRIP-ACNC: NORMAL
VLDLC SERPL CALC-MCNC: 25 MG/DL (ref 0–30)
WBC # BLD AUTO: 16 X10(3) UL (ref 4–11)

## 2024-04-04 PROCEDURE — 85025 COMPLETE CBC W/AUTO DIFF WBC: CPT

## 2024-04-04 PROCEDURE — 80053 COMPREHEN METABOLIC PANEL: CPT

## 2024-04-04 PROCEDURE — 80061 LIPID PANEL: CPT

## 2024-04-04 PROCEDURE — 81003 URINALYSIS AUTO W/O SCOPE: CPT

## 2024-04-04 PROCEDURE — 99396 PREV VISIT EST AGE 40-64: CPT | Performed by: FAMILY MEDICINE

## 2024-04-04 PROCEDURE — 36415 COLL VENOUS BLD VENIPUNCTURE: CPT

## 2024-04-04 RX ORDER — SILDENAFIL CITRATE 20 MG/1
TABLET ORAL
Qty: 50 TABLET | Refills: 3 | Status: SHIPPED | OUTPATIENT
Start: 2024-04-04

## 2024-04-04 RX ORDER — LISINOPRIL 20 MG/1
TABLET ORAL
Qty: 90 TABLET | Refills: 3 | Status: SHIPPED | OUTPATIENT
Start: 2024-04-04

## 2024-04-04 NOTE — PROGRESS NOTES
Subjective:   Price Gillis is a 58 year old male who presents for Routine Physical       History/Other:    Chief Complaint Reviewed and Verified  No Further Nursing Notes to   Review  Tobacco Reviewed  Allergies Reviewed  Medications Reviewed    Problem List Reviewed  Medical History Reviewed  Surgical History   Reviewed  Family History Reviewed  Social History Reviewed         Tobacco:  He smoked tobacco in the past but quit greater than 12 months ago.  Social History    Tobacco Use      Smoking status: Former        Packs/day: 1.00        Years: 30.00        Additional pack years: 0.00        Total pack years: 30.00        Types: Cigarettes        Quit date: 2015        Years since quittin.3        Passive exposure: Past      Smokeless tobacco: Never       Current Outpatient Medications   Medication Sig Dispense Refill    lisinopril 20 MG Oral Tab TAKE 1 TABLET BY MOUTH ONCE DAILY 90 tablet 3    sildenafil 20 MG Oral Tab Take 2-4 tablets as needed prior to sexual intercourse 50 tablet 3         Review of Systems:  Review of Systems   Constitutional: Negative.    HENT: Negative.     Eyes: Negative.    Respiratory: Negative.     Cardiovascular: Negative.    Gastrointestinal: Negative.    Endocrine: Negative for polydipsia, polyphagia and polyuria.   Genitourinary: Negative.    Musculoskeletal: Negative.    Skin: Negative.         No mole changes   Allergic/Immunologic: Negative for environmental allergies.   Neurological:  Negative for dizziness, weakness, numbness and headaches.   Hematological: Negative.    Psychiatric/Behavioral:  Negative for sleep disturbance.         No anxiety or depressed feelings         Objective:   /80   Pulse 81   Ht 5' 10\" (1.778 m)   Wt 259 lb 12.8 oz (117.8 kg)   BMI 37.28 kg/m²  Estimated body mass index is 37.28 kg/m² as calculated from the following:    Height as of this encounter: 5' 10\" (1.778 m).    Weight as of this encounter: 259 lb 12.8 oz  (117.8 kg).  Physical Exam  Vitals reviewed.   Constitutional:       Appearance: Normal appearance. He is well-developed. He is obese.   HENT:      Head: Normocephalic.      Right Ear: Tympanic membrane, ear canal and external ear normal.      Left Ear: Tympanic membrane, ear canal and external ear normal.      Nose: Nose normal.   Eyes:      General: Lids are normal.      Conjunctiva/sclera: Conjunctivae normal.      Pupils: Pupils are equal, round, and reactive to light.      Funduscopic exam:     Right eye: No hemorrhage or papilledema.         Left eye: No hemorrhage or papilledema.   Neck:      Vascular: Normal carotid pulses. No JVD.      Trachea: Trachea normal.   Cardiovascular:      Rate and Rhythm: Regular rhythm.      Pulses:           Carotid pulses are 2+ on the right side and 2+ on the left side.       Radial pulses are 2+ on the right side and 2+ on the left side.      Heart sounds: Normal heart sounds.   Pulmonary:      Breath sounds: Normal breath sounds.   Abdominal:      Tenderness: There is no abdominal tenderness.   Musculoskeletal:      Cervical back: Normal, normal range of motion and neck supple.      Thoracic back: Normal.      Lumbar back: Normal.      Right knee: Tenderness present.      Left knee: Tenderness present.   Lymphadenopathy:      Cervical: No cervical adenopathy.   Skin:     Comments: No suspicious lesions waist up exam   Neurological:      General: No focal deficit present.      Mental Status: He is alert and oriented to person, place, and time.      Sensory: No sensory deficit.      Deep Tendon Reflexes: Reflexes are normal and symmetric.   Psychiatric:         Mood and Affect: Mood normal. Mood is not anxious or depressed.       Vitals:    04/04/24 0918   BP: 130/80   Pulse:           Assessment & Plan:   1. Routine general medical examination at a health care facility (Primary)  -     CBC With Differential With Platelet; Future; Expected date: 04/04/2024  -     Comp  Metabolic Panel (14); Future; Expected date: 04/04/2024  -     Lipid Panel; Future; Expected date: 04/04/2024  -     PSA Total, Screen; Future; Expected date: 04/04/2024  -     Urinalysis, Routine; Future; Expected date: 04/04/2024    2. Essential hypertension  -     CT CALCIUM SCORING; Future; Expected date: 04/04/2024  CPM.  Refilled.     3. ANATOLY (obstructive sleep apnea)  Overview:  ORAL APPLIANCE.  Uses oral appliance and in the middle of getting fitting for new appliance through insurance and online vendor.  Otherwise wears regularly and benefits from continued use.       4. Post-traumatic osteoarthritis of left knee    Synvist injections help    5. Former tobacco use    Offered lung screening. On and off smoking but usually not.     Other orders  -     Lisinopril; TAKE 1 TABLET BY MOUTH ONCE DAILY  Dispense: 90 tablet; Refill: 3  -     Sildenafil Citrate; Take 2-4 tablets as needed prior to sexual intercourse  Dispense: 50 tablet; Refill: 3        Return in about 1 year (around 4/4/2025).    David Langley DO, 4/4/2024, 9:36 AM

## 2024-04-11 RX ORDER — LISINOPRIL 20 MG/1
TABLET ORAL
Qty: 30 TABLET | Refills: 0 | OUTPATIENT
Start: 2024-04-11

## 2024-04-15 DIAGNOSIS — D72.828 OTHER ELEVATED WHITE BLOOD CELL (WBC) COUNT: Primary | ICD-10-CM

## 2024-05-10 ENCOUNTER — TELEPHONE (OUTPATIENT)
Dept: FAMILY MEDICINE CLINIC | Facility: CLINIC | Age: 59
End: 2024-05-10

## 2024-05-10 NOTE — TELEPHONE ENCOUNTER
Patient calling to confirm that he needed to repeat a blood test from last month's test results.  Confirmed per Dr. Langley's 4/4/24 result note that patient did need to repeat a CBC in one month  Patient was informed and advised of [facility] lab location and hours  Patient verbalized understanding   Patient had no further questions at this time

## 2024-05-15 ENCOUNTER — LAB ENCOUNTER (OUTPATIENT)
Dept: LAB | Age: 59
End: 2024-05-15
Attending: FAMILY MEDICINE

## 2024-05-15 DIAGNOSIS — D72.828 OTHER ELEVATED WHITE BLOOD CELL (WBC) COUNT: ICD-10-CM

## 2024-05-15 LAB
BASOPHILS # BLD AUTO: 0.05 X10(3) UL (ref 0–0.2)
BASOPHILS NFR BLD AUTO: 0.6 %
DEPRECATED RDW RBC AUTO: 45.8 FL (ref 35.1–46.3)
EOSINOPHIL # BLD AUTO: 0.18 X10(3) UL (ref 0–0.7)
EOSINOPHIL NFR BLD AUTO: 2.3 %
ERYTHROCYTE [DISTWIDTH] IN BLOOD BY AUTOMATED COUNT: 13.1 % (ref 11–15)
HCT VFR BLD AUTO: 38.9 %
HGB BLD-MCNC: 13.3 G/DL
IMM GRANULOCYTES # BLD AUTO: 0.11 X10(3) UL (ref 0–1)
IMM GRANULOCYTES NFR BLD: 1.4 %
LYMPHOCYTES # BLD AUTO: 3.44 X10(3) UL (ref 1–4)
LYMPHOCYTES NFR BLD AUTO: 43.8 %
MCH RBC QN AUTO: 32.4 PG (ref 26–34)
MCHC RBC AUTO-ENTMCNC: 34.2 G/DL (ref 31–37)
MCV RBC AUTO: 94.9 FL
MONOCYTES # BLD AUTO: 1 X10(3) UL (ref 0.1–1)
MONOCYTES NFR BLD AUTO: 12.7 %
NEUTROPHILS # BLD AUTO: 3.07 X10 (3) UL (ref 1.5–7.7)
NEUTROPHILS # BLD AUTO: 3.07 X10(3) UL (ref 1.5–7.7)
NEUTROPHILS NFR BLD AUTO: 39.2 %
PLATELET # BLD AUTO: 255 10(3)UL (ref 150–450)
RBC # BLD AUTO: 4.1 X10(6)UL
WBC # BLD AUTO: 7.9 X10(3) UL (ref 4–11)

## 2024-05-15 PROCEDURE — 85025 COMPLETE CBC W/AUTO DIFF WBC: CPT

## 2024-05-15 PROCEDURE — 36415 COLL VENOUS BLD VENIPUNCTURE: CPT

## 2024-08-22 ENCOUNTER — HOSPITAL ENCOUNTER (OUTPATIENT)
Dept: CT IMAGING | Facility: HOSPITAL | Age: 59
Discharge: HOME | End: 2024-08-22
Attending: FAMILY MEDICINE

## 2024-08-22 DIAGNOSIS — I10 ESSENTIAL HYPERTENSION: ICD-10-CM

## 2024-08-22 NOTE — PROGRESS NOTES
Date of Service 8/22/2024    KATHERYN BOOTH  Date of Birth 11/20/1965    Patient Age: 58 year old    PCP: David Langley, DO  [address] [place] [address]    Heart Scan Consult  Preliminary Heart Scan Score: 170    Previous Screening  Heart Scan Completed Previously: No                   Risk Factors  Personal Risk Factors  Alterable Risk Factors: Smoking;High Blood Pressure;Abnormal Cholesterol;Lack of exercise;Obstructive sleep apnea;Obesity      Body Mass Index  BMI 37    Blood Pressure  /80 on med.  (Normal =< 120/80,  Elevated = 120-129/ >80,  High Stage1 130-139/80-89 , Stage2 >140/>90)    Lipid Profile  Cholesterol: 187, done on 4/4/2024.  HDL Cholesterol: 54, done on 4/4/2024.  LDL Cholesterol: 107, done on 4/4/2024.  TriGlycerides 150, done on 4/4/2024.  Not on med.    Cholesterol Goals  Value   Total  =< 200   HDL  = > 45 Men = > 55 Women   LDL   =< 100   Triglycerides  =< 150       Glucose and Hemoglobin A1C  Lab Results   Component Value Date    GLU 94 04/04/2024     (Normal Fasting Glucose < 100mg/dl )    Nurse Review  Risk factor information and results reviewed with Nurse: Yes    Recommended Follow Up:  Consult your physician regarding:: Final Heart Scan Report;Discuss potential for Incidental Finding      Recommendations for Change:  Nutrition Changes: Low Saturated Fat;Low Fat Dairy;Increase Fiber    Cholesterol Modification (goal of therapy depends upon your risk): Decrease LDL (Lousy/Bad) Ideal <100    Exercise: Enhance Current Program    Smoking Cessation: Not Ready to Quit    Weight Management: Decrease Current Weight    Stress Management: Adopt Stress Management Techniques    Repeat Heart Scan: 3 Years if Calcium Score is > 0.0;Discuss with your Physician              [place] Recommended Resources:  Recommended Resources: Upcoming Classes, Medical Services and Health Library [email or web address]            Le MURRY RN        Please Contact the Nurse Heart Line with any Questions or  Concerns [phone].

## 2024-11-13 ENCOUNTER — TELEPHONE (OUTPATIENT)
Dept: FAMILY MEDICINE CLINIC | Facility: CLINIC | Age: 59
End: 2024-11-13
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
## 2024-11-15 ENCOUNTER — OFFICE VISIT (OUTPATIENT)
Dept: FAMILY MEDICINE CLINIC | Facility: CLINIC | Age: 59
End: 2024-11-15
Payer: COMMERCIAL

## 2024-11-15 VITALS
OXYGEN SATURATION: 97 % | HEIGHT: 70 IN | SYSTOLIC BLOOD PRESSURE: 139 MMHG | HEART RATE: 73 BPM | BODY MASS INDEX: 35.93 KG/M2 | DIASTOLIC BLOOD PRESSURE: 88 MMHG | RESPIRATION RATE: 20 BRPM | TEMPERATURE: 98 F | WEIGHT: 251 LBS

## 2024-11-15 DIAGNOSIS — S09.90XD TRAUMATIC INJURY OF HEAD, SUBSEQUENT ENCOUNTER: ICD-10-CM

## 2024-11-15 DIAGNOSIS — S42.002D CLOSED NONDISPLACED FRACTURE OF LEFT CLAVICLE WITH ROUTINE HEALING, UNSPECIFIED PART OF CLAVICLE, SUBSEQUENT ENCOUNTER: ICD-10-CM

## 2024-11-15 DIAGNOSIS — S22.42XD CLOSED FRACTURE OF MULTIPLE RIBS OF LEFT SIDE WITH ROUTINE HEALING, SUBSEQUENT ENCOUNTER: Primary | ICD-10-CM

## 2024-11-15 PROCEDURE — 99214 OFFICE O/P EST MOD 30 MIN: CPT | Performed by: FAMILY MEDICINE

## 2024-11-15 RX ORDER — HYDROCODONE BITARTRATE AND ACETAMINOPHEN 10; 325 MG/1; MG/1
1 TABLET ORAL EVERY 6 HOURS PRN
Qty: 45 TABLET | Refills: 0 | Status: SHIPPED | OUTPATIENT
Start: 2024-11-15 | End: 2024-11-15

## 2024-11-15 RX ORDER — IBUPROFEN 600 MG/1
600 TABLET, FILM COATED ORAL EVERY 6 HOURS PRN
Qty: 90 TABLET | Refills: 1 | Status: SHIPPED | OUTPATIENT
Start: 2024-11-15

## 2024-11-15 RX ORDER — HYDROCODONE BITARTRATE AND ACETAMINOPHEN 10; 325 MG/1; MG/1
1 TABLET ORAL EVERY 6 HOURS PRN
Qty: 45 TABLET | Refills: 0 | Status: SHIPPED | OUTPATIENT
Start: 2024-11-15

## 2024-11-15 RX ORDER — ACETAMINOPHEN 325 MG/1
325 TABLET ORAL EVERY 4 HOURS PRN
COMMUNITY
Start: 2024-11-10 | End: 2024-11-15
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
## 2025-03-25 RX ORDER — LISINOPRIL 20 MG/1
TABLET ORAL
Qty: 90 TABLET | Refills: 3 | Status: SHIPPED | OUTPATIENT
Start: 2025-03-25

## 2025-03-25 NOTE — TELEPHONE ENCOUNTER
Refill Per Protocol     Requested Prescriptions   Pending Prescriptions Disp Refills    LISINOPRIL 20 MG Oral Tab [Pharmacy Med Name: LISINOPRIL 20MG TABLETS] 90 tablet 3     Sig: TAKE 1 TABLET BY MOUTH DAILY       Hypertension Medications Protocol Passed - 3/25/2025 12:04 PM        Passed - CMP or BMP in past 12 months        Passed - Last BP reading less than 140/90     BP Readings from Last 1 Encounters:   11/15/24 139/88               Passed - In person appointment or virtual visit in the past 12 mos or appointment in next 3 mos     Recent Outpatient Visits              4 months ago Closed fracture of multiple ribs of left side with routine healing, subsequent encounter    [facility], [address], David Sosa,     Office Visit    11 months ago Routine general medical examination at a health care facility    [facility] [address], David Sosa,     Office Visit    1 year ago Multiple benign nevi    [facility], Giorgi Franklin MD    Office Visit    1 year ago Routine general medical examination at a health care facility    [facility], [address], David Sosa,     Office Visit    2 years ago Colon cancer screening    [facility], [facility], Kt Red MD    Office Visit                      Passed - EGFRCR or GFRNAA > 50     GFR Evaluation  EGFRCR: 67 , resulted on 4/4/2024          Passed - Medication is active on med list

## 2025-03-27 ENCOUNTER — TELEPHONE (OUTPATIENT)
Dept: FAMILY MEDICINE CLINIC | Facility: CLINIC | Age: 60
End: 2025-03-27

## 2025-03-27 NOTE — TELEPHONE ENCOUNTER
I may have said this could take 4-6 weeks but did not say he has to be off 4-6 weeks. 2. He was supposed to return for follow up and never did return.  3. I hesitate giving a letter stating I directed him to be off work for four weeks. 4. Why did he not follow up and ask for a letter at that time.
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
## 2025-03-27 NOTE — TELEPHONE ENCOUNTER
Patient called back to provide fax number '      FAX: [phone] ATTN ASHLEY WEEMS     Patient is also asking for a copy he can

## 2025-03-31 ENCOUNTER — OFFICE VISIT (OUTPATIENT)
Dept: FAMILY MEDICINE CLINIC | Facility: CLINIC | Age: 60
End: 2025-03-31

## 2025-03-31 VITALS
HEIGHT: 70 IN | WEIGHT: 252 LBS | SYSTOLIC BLOOD PRESSURE: 127 MMHG | TEMPERATURE: 97 F | BODY MASS INDEX: 36.08 KG/M2 | HEART RATE: 69 BPM | DIASTOLIC BLOOD PRESSURE: 76 MMHG

## 2025-03-31 DIAGNOSIS — S42.002D CLOSED NONDISPLACED FRACTURE OF LEFT CLAVICLE WITH ROUTINE HEALING, UNSPECIFIED PART OF CLAVICLE, SUBSEQUENT ENCOUNTER: ICD-10-CM

## 2025-03-31 DIAGNOSIS — S22.42XD CLOSED FRACTURE OF MULTIPLE RIBS OF LEFT SIDE WITH ROUTINE HEALING, SUBSEQUENT ENCOUNTER: Primary | ICD-10-CM

## 2025-03-31 PROCEDURE — 99213 OFFICE O/P EST LOW 20 MIN: CPT | Performed by: FAMILY MEDICINE
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
## 2025-04-24 ENCOUNTER — LAB ENCOUNTER (OUTPATIENT)
Dept: LAB | Age: 60
End: 2025-04-24
Attending: FAMILY MEDICINE
Payer: COMMERCIAL

## 2025-04-24 ENCOUNTER — OFFICE VISIT (OUTPATIENT)
Dept: FAMILY MEDICINE CLINIC | Facility: CLINIC | Age: 60
End: 2025-04-24
Payer: COMMERCIAL

## 2025-04-24 VITALS
SYSTOLIC BLOOD PRESSURE: 128 MMHG | HEIGHT: 70 IN | WEIGHT: 258 LBS | DIASTOLIC BLOOD PRESSURE: 79 MMHG | OXYGEN SATURATION: 97 % | TEMPERATURE: 98 F | HEART RATE: 75 BPM | BODY MASS INDEX: 36.94 KG/M2 | RESPIRATION RATE: 20 BRPM

## 2025-04-24 DIAGNOSIS — I10 ESSENTIAL HYPERTENSION: ICD-10-CM

## 2025-04-24 DIAGNOSIS — Z00.00 ROUTINE GENERAL MEDICAL EXAMINATION AT A HEALTH CARE FACILITY: ICD-10-CM

## 2025-04-24 DIAGNOSIS — G47.33 OSA (OBSTRUCTIVE SLEEP APNEA): ICD-10-CM

## 2025-04-24 DIAGNOSIS — Z00.00 ROUTINE GENERAL MEDICAL EXAMINATION AT A HEALTH CARE FACILITY: Primary | ICD-10-CM

## 2025-04-24 LAB
ALBUMIN SERPL-MCNC: 4.5 G/DL (ref 3.2–4.8)
ALBUMIN/GLOB SERPL: 2 {RATIO} (ref 1–2)
ALP LIVER SERPL-CCNC: 88 U/L (ref 45–117)
ALT SERPL-CCNC: 19 U/L (ref 10–49)
ANION GAP SERPL CALC-SCNC: 6 MMOL/L (ref 0–18)
AST SERPL-CCNC: 21 U/L (ref ?–34)
BASOPHILS # BLD AUTO: 0.06 X10(3) UL (ref 0–0.2)
BASOPHILS NFR BLD AUTO: 0.8 %
BILIRUB SERPL-MCNC: 0.3 MG/DL (ref 0.3–1.2)
BILIRUB UR QL: NEGATIVE
BUN BLD-MCNC: 13 MG/DL (ref 9–23)
BUN/CREAT SERPL: 12.5 (ref 10–20)
CALCIUM BLD-MCNC: 9 MG/DL (ref 8.7–10.4)
CHLORIDE SERPL-SCNC: 107 MMOL/L (ref 98–112)
CHOLEST SERPL-MCNC: 166 MG/DL (ref ?–200)
CLARITY UR: CLEAR
CO2 SERPL-SCNC: 26 MMOL/L (ref 21–32)
COMPLEXED PSA SERPL-MCNC: 3.13 NG/ML (ref ?–4)
CREAT BLD-MCNC: 1.04 MG/DL (ref 0.7–1.3)
DEPRECATED RDW RBC AUTO: 47.8 FL (ref 35.1–46.3)
EGFRCR SERPLBLD CKD-EPI 2021: 83 ML/MIN/1.73M2 (ref 60–?)
EOSINOPHIL # BLD AUTO: 0.15 X10(3) UL (ref 0–0.7)
EOSINOPHIL NFR BLD AUTO: 2.1 %
ERYTHROCYTE [DISTWIDTH] IN BLOOD BY AUTOMATED COUNT: 13.6 % (ref 11–15)
FASTING PATIENT LIPID ANSWER: YES
FASTING STATUS PATIENT QL REPORTED: YES
GLOBULIN PLAS-MCNC: 2.3 G/DL (ref 2–3.5)
GLUCOSE BLD-MCNC: 100 MG/DL (ref 70–99)
GLUCOSE UR-MCNC: NORMAL MG/DL
HCT VFR BLD AUTO: 39 % (ref 39–53)
HDLC SERPL-MCNC: 47 MG/DL (ref 40–59)
HGB BLD-MCNC: 13 G/DL (ref 13–17.5)
HGB UR QL STRIP.AUTO: NEGATIVE
IMM GRANULOCYTES # BLD AUTO: 0.08 X10(3) UL (ref 0–1)
IMM GRANULOCYTES NFR BLD: 1.1 %
KETONES UR-MCNC: NEGATIVE MG/DL
LDLC SERPL CALC-MCNC: 85 MG/DL (ref ?–100)
LEUKOCYTE ESTERASE UR QL STRIP.AUTO: NEGATIVE
LYMPHOCYTES # BLD AUTO: 2.53 X10(3) UL (ref 1–4)
LYMPHOCYTES NFR BLD AUTO: 34.7 %
MCH RBC QN AUTO: 31.9 PG (ref 26–34)
MCHC RBC AUTO-ENTMCNC: 33.3 G/DL (ref 31–37)
MCV RBC AUTO: 95.8 FL (ref 80–100)
MONOCYTES # BLD AUTO: 0.69 X10(3) UL (ref 0.1–1)
MONOCYTES NFR BLD AUTO: 9.5 %
NEUTROPHILS # BLD AUTO: 3.79 X10 (3) UL (ref 1.5–7.7)
NEUTROPHILS # BLD AUTO: 3.79 X10(3) UL (ref 1.5–7.7)
NEUTROPHILS NFR BLD AUTO: 51.8 %
NITRITE UR QL STRIP.AUTO: NEGATIVE
NONHDLC SERPL-MCNC: 119 MG/DL (ref ?–130)
OSMOLALITY SERPL CALC.SUM OF ELEC: 288 MOSM/KG (ref 275–295)
PH UR: 6.5 [PH] (ref 5–8)
PLATELET # BLD AUTO: 225 10(3)UL (ref 150–450)
POTASSIUM SERPL-SCNC: 4.4 MMOL/L (ref 3.5–5.1)
PROT SERPL-MCNC: 6.8 G/DL (ref 5.7–8.2)
PROT UR-MCNC: NEGATIVE MG/DL
RBC # BLD AUTO: 4.07 X10(6)UL (ref 4.3–5.7)
SODIUM SERPL-SCNC: 139 MMOL/L (ref 136–145)
SP GR UR STRIP: 1.01 (ref 1–1.03)
TRIGL SERPL-MCNC: 202 MG/DL (ref 30–149)
UROBILINOGEN UR STRIP-ACNC: NORMAL
VLDLC SERPL CALC-MCNC: 32 MG/DL (ref 0–30)
WBC # BLD AUTO: 7.3 X10(3) UL (ref 4–11)

## 2025-04-24 PROCEDURE — 85025 COMPLETE CBC W/AUTO DIFF WBC: CPT

## 2025-04-24 PROCEDURE — 80061 LIPID PANEL: CPT

## 2025-04-24 PROCEDURE — 80053 COMPREHEN METABOLIC PANEL: CPT

## 2025-04-24 PROCEDURE — 36415 COLL VENOUS BLD VENIPUNCTURE: CPT

## 2025-04-24 PROCEDURE — 81003 URINALYSIS AUTO W/O SCOPE: CPT

## 2025-04-24 RX ORDER — SILDENAFIL CITRATE 20 MG/1
TABLET ORAL
Qty: 50 TABLET | Refills: 3 | Status: SHIPPED | OUTPATIENT
Start: 2025-04-24

## 2025-04-24 NOTE — PROGRESS NOTES
Subjective:   Price Gillis is a 59 year old male who presents for Physical       History/Other:    Chief Complaint Reviewed and Verified  No Further Nursing Notes to   Review  Tobacco Reviewed  Allergies Reviewed  Problem List Reviewed    Medical History Reviewed  Surgical History Reviewed  Family History   Reviewed  Social History Reviewed         Tobacco:  Tobacco Use[1]  E-Cigarettes/Vaping       Questions Responses    E-Cigarette Use Former User          E-Cigarette/Vaping Substances       Questions Responses    Nicotine No    THC No    CBD No    Flavoring No          E-Cigarette/Vaping Devices       Questions Responses    Disposable No    Pre-filled or Refillable Cartridge No    Refillable Tank No    Pre-filled Pod No        .      Current Medications[2]      Review of Systems:  Review of Systems   Constitutional: Negative.    HENT: Negative.     Eyes: Negative.    Respiratory: Negative.     Cardiovascular: Negative.    Gastrointestinal: Negative.    Endocrine: Negative for polydipsia, polyphagia and polyuria.   Genitourinary: Negative.    Musculoskeletal:  Positive for arthralgias.        Knee pain   Skin: Negative.         No mole changes   Allergic/Immunologic: Negative for environmental allergies.   Neurological:  Negative for dizziness, weakness, numbness and headaches.   Hematological: Negative.    Psychiatric/Behavioral:  Negative for sleep disturbance.         No anxiety or depressed feelings         Objective:   /79   Pulse 75   Temp 97.5 °F (36.4 °C) (Temporal)   Resp 20   Ht 5' 10\" (1.778 m)   Wt 258 lb (117 kg)   SpO2 97%   BMI 37.02 kg/m²  Estimated body mass index is 37.02 kg/m² as calculated from the following:    Height as of this encounter: 5' 10\" (1.778 m).    Weight as of this encounter: 258 lb (117 kg).  Physical Exam  Vitals reviewed.   Constitutional:       Appearance: Normal appearance. He is well-developed.   HENT:      Head: Normocephalic.      Right Ear: Tympanic  membrane, ear canal and external ear normal.      Left Ear: Tympanic membrane, ear canal and external ear normal.      Nose: Nose normal.   Eyes:      General: Lids are normal.      Conjunctiva/sclera: Conjunctivae normal.      Pupils: Pupils are equal, round, and reactive to light.      Funduscopic exam:     Right eye: No hemorrhage or papilledema.         Left eye: No hemorrhage or papilledema.   Neck:      Vascular: Normal carotid pulses. No JVD.      Trachea: Trachea normal.   Cardiovascular:      Rate and Rhythm: Regular rhythm.      Pulses:           Carotid pulses are 2+ on the right side and 2+ on the left side.       Radial pulses are 2+ on the right side and 2+ on the left side.      Heart sounds: Normal heart sounds.   Pulmonary:      Breath sounds: Normal breath sounds.   Abdominal:      Tenderness: There is no abdominal tenderness.   Musculoskeletal:      Cervical back: Normal, normal range of motion and neck supple.      Thoracic back: Normal.      Lumbar back: Normal.   Lymphadenopathy:      Cervical: No cervical adenopathy.   Skin:     Comments: No suspicious lesions waist up exam   Neurological:      General: No focal deficit present.      Mental Status: He is alert and oriented to person, place, and time.      Sensory: No sensory deficit.      Deep Tendon Reflexes: Reflexes are normal and symmetric.   Psychiatric:         Mood and Affect: Mood normal. Mood is not anxious or depressed.           Assessment & Plan:   1. Routine general medical examination at a health care facility (Primary)  -     CBC With Differential With Platelet; Future; Expected date: 04/24/2025  -     Comp Metabolic Panel (14); Future; Expected date: 04/24/2025  -     Lipid Panel; Future; Expected date: 04/24/2025  -     PSA Total, Screen; Future; Expected date: 04/24/2025  -     Urinalysis, Routine; Future; Expected date: 04/24/2025  2. Essential hypertension  3. ANATOLY (obstructive sleep apnea)  Overview:  ORAL  APPLIANCE  Orders:  -     Diagnostic Sleep Study  -     General sleep study; Future; Expected date: 2025  Other orders  -     Sildenafil Citrate; Take 2-4 tablets as needed prior to sexual intercourse  Dispense: 50 tablet; Refill: 3    Recommend another sleep study with the appliance to prove it remains effective .  He never tolerated cpap.  He is a . Seeing ortho for knee joint injections.     No follow-ups on file.    David Langley DO, 2025, 12:52 PM        [1]   Social History  Tobacco Use   Smoking Status Every Day    Current packs/day: 0.00    Average packs/day: 1 pack/day for 30.0 years (30.0 ttl pk-yrs)    Types: Cigarettes    Start date: 1985    Last attempt to quit: 2015    Years since quittin.4    Passive exposure: Past   Smokeless Tobacco Never   [2]   Current Outpatient Medications   Medication Sig Dispense Refill    sildenafil 20 MG Oral Tab Take 2-4 tablets as needed prior to sexual intercourse 50 tablet 3    lisinopril 20 MG Oral Tab TAKE 1 TABLET BY MOUTH DAILY 90 tablet 3

## 2025-04-28 ENCOUNTER — ORDER TRANSCRIPTION (OUTPATIENT)
Dept: SLEEP CENTER | Age: 60
End: 2025-04-28

## 2025-04-28 DIAGNOSIS — G47.33 OBSTRUCTIVE SLEEP APNEA (ADULT) (PEDIATRIC): Primary | ICD-10-CM

## 2025-04-30 DIAGNOSIS — R97.20 PSA ELEVATION: Primary | ICD-10-CM

## 2025-06-18 ENCOUNTER — TELEPHONE (OUTPATIENT)
Dept: SLEEP CENTER | Age: 60
End: 2025-06-18

## 2025-06-23 ENCOUNTER — MED REC SCAN ONLY (OUTPATIENT)
Dept: FAMILY MEDICINE CLINIC | Facility: CLINIC | Age: 60
End: 2025-06-23

## 2025-06-23 ENCOUNTER — TELEPHONE (OUTPATIENT)
Dept: FAMILY MEDICINE CLINIC | Facility: CLINIC | Age: 60
End: 2025-06-23

## 2025-08-28 ENCOUNTER — ORDER TRANSCRIPTION (OUTPATIENT)
Dept: SLEEP CENTER | Age: 60
End: 2025-08-28

## 2025-08-28 DIAGNOSIS — G47.33 OBSTRUCTIVE SLEEP APNEA (ADULT) (PEDIATRIC): Primary | ICD-10-CM

## (undated) DEVICE — MEDI-VAC NON-CONDUCTIVE SUCTION TUBING: Brand: CARDINAL HEALTH

## (undated) DEVICE — TOWEL OR BLU 16X26 STRL

## (undated) DEVICE — NEEDLE SCORPION AR-13995N

## (undated) DEVICE — SLEEVE CMPR VLCR STRL

## (undated) DEVICE — GOWN SURG AERO BLUE PERF XLG

## (undated) DEVICE — SUTURE FIBERWIRE S AR-7200

## (undated) DEVICE — CANNULA 7MM TWIST AR-6570

## (undated) DEVICE — BATTERY

## (undated) DEVICE — BURR SHVR COOLCUT 13CM 4MM 8

## (undated) DEVICE — ABDOMINAL PAD: Brand: CURITY

## (undated) DEVICE — BLADE 11 SHRP BP SS SRG STRL

## (undated) DEVICE — SUTURE ETHILON 4-0 662G

## (undated) DEVICE — CUTTER ASCP COOLCUT 13CM 4MM

## (undated) DEVICE — SOL  .9 3000ML

## (undated) DEVICE — SUTURE FIBERLINK FBRWR 2 26IN

## (undated) DEVICE — STERILE LATEX POWDER-FREE SURGICAL GLOVESWITH NITRILE COATING: Brand: PROTEXIS

## (undated) DEVICE — AMBIENT SUPER TURBOVAC 90 IFS: Brand: COBLATION

## (undated) DEVICE — CHLORAPREP 26ML APPLICATOR

## (undated) DEVICE — 3M™ MEDITPORE™ SOFT CLOTH TAPE 6 IN X 10 YD 12 ROLLS/CASE 2966: Brand: 3M™ MEDIPORE™

## (undated) DEVICE — TUBING SET, GRAVITY, 4-SPIKE

## (undated) DEVICE — SUTURE FIBERTAPE 2 AR-7237-7

## (undated) DEVICE — SHOULDER ARTHROSCOPY: Brand: MEDLINE INDUSTRIES, INC.
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
## (undated) NOTE — LETTER
12/7/2018              [address] [place] [address]         To Whom it may concern: This is to certify that Hilario Schneider had an appointment on 12/7/2018 at 11:36 AM with Elkin Kenyon DO.   Sudheer Gutierres is known to have sl

## (undated) NOTE — Clinical Note
TCM 1/26/18. Outreach complete. FYI: Message left for Dr Ludy Blackwood office to f/u w pt on whether pt should be taking ASA for clot prevention.